# Patient Record
Sex: MALE | Race: WHITE | Employment: FULL TIME | URBAN - METROPOLITAN AREA
[De-identification: names, ages, dates, MRNs, and addresses within clinical notes are randomized per-mention and may not be internally consistent; named-entity substitution may affect disease eponyms.]

---

## 2017-02-13 ENCOUNTER — APPOINTMENT (OUTPATIENT)
Dept: GENERAL RADIOLOGY | Facility: HOSPITAL | Age: 57
DRG: 247 | End: 2017-02-13
Payer: COMMERCIAL

## 2017-02-13 ENCOUNTER — HOSPITAL ENCOUNTER (INPATIENT)
Facility: HOSPITAL | Age: 57
LOS: 2 days | Discharge: HOME | DRG: 247 | End: 2017-02-16
Attending: EMERGENCY MEDICINE | Admitting: INTERNAL MEDICINE
Payer: COMMERCIAL

## 2017-02-13 DIAGNOSIS — I25.10 CAD (CORONARY ARTERY DISEASE): ICD-10-CM

## 2017-02-13 DIAGNOSIS — Z95.5 S/P DRUG ELUTING CORONARY STENT PLACEMENT: ICD-10-CM

## 2017-02-13 DIAGNOSIS — I21.11 ST ELEVATION MYOCARDIAL INFARCTION INVOLVING RIGHT CORONARY ARTERY (HCC): Primary | ICD-10-CM

## 2017-02-13 PROCEDURE — 85730 THROMBOPLASTIN TIME PARTIAL: CPT

## 2017-02-13 PROCEDURE — 84484 ASSAY OF TROPONIN QUANT: CPT

## 2017-02-13 PROCEDURE — 93005 ELECTROCARDIOGRAM TRACING: CPT

## 2017-02-13 PROCEDURE — 99285 EMERGENCY DEPT VISIT HI MDM: CPT

## 2017-02-13 PROCEDURE — 71010 XR CHEST AP PORTABLE  (CPT=71010): CPT

## 2017-02-13 PROCEDURE — 80061 LIPID PANEL: CPT | Performed by: EMERGENCY MEDICINE

## 2017-02-13 PROCEDURE — 85025 COMPLETE CBC W/AUTO DIFF WBC: CPT

## 2017-02-13 PROCEDURE — 96374 THER/PROPH/DIAG INJ IV PUSH: CPT

## 2017-02-13 PROCEDURE — 80048 BASIC METABOLIC PNL TOTAL CA: CPT

## 2017-02-13 PROCEDURE — 93010 ELECTROCARDIOGRAM REPORT: CPT | Performed by: EMERGENCY MEDICINE

## 2017-02-13 PROCEDURE — 85610 PROTHROMBIN TIME: CPT

## 2017-02-13 RX ORDER — SODIUM CHLORIDE 9 MG/ML
INJECTION, SOLUTION INTRAVENOUS
Status: COMPLETED | OUTPATIENT
Start: 2017-02-13 | End: 2017-02-13

## 2017-02-13 RX ORDER — LIDOCAINE HYDROCHLORIDE 20 MG/ML
INJECTION, SOLUTION EPIDURAL; INFILTRATION; INTRACAUDAL; PERINEURAL
Status: COMPLETED
Start: 2017-02-13 | End: 2017-02-13

## 2017-02-13 RX ORDER — MIDAZOLAM HYDROCHLORIDE 1 MG/ML
INJECTION INTRAMUSCULAR; INTRAVENOUS
Status: COMPLETED
Start: 2017-02-13 | End: 2017-02-13

## 2017-02-14 ENCOUNTER — APPOINTMENT (OUTPATIENT)
Dept: INTERVENTIONAL RADIOLOGY/VASCULAR | Facility: HOSPITAL | Age: 57
DRG: 247 | End: 2017-02-14
Attending: EMERGENCY MEDICINE
Payer: COMMERCIAL

## 2017-02-14 ENCOUNTER — PRIOR ORIGINAL RECORDS (OUTPATIENT)
Dept: OTHER | Age: 57
End: 2017-02-14

## 2017-02-14 ENCOUNTER — APPOINTMENT (OUTPATIENT)
Dept: CV DIAGNOSTICS | Facility: HOSPITAL | Age: 57
DRG: 247 | End: 2017-02-14
Attending: INTERNAL MEDICINE
Payer: COMMERCIAL

## 2017-02-14 PROBLEM — I21.11 ST ELEVATION MYOCARDIAL INFARCTION INVOLVING RIGHT CORONARY ARTERY (HCC): Status: ACTIVE | Noted: 2017-02-14

## 2017-02-14 LAB
ANION GAP SERPL CALC-SCNC: 12 MMOL/L (ref 0–18)
ANION GAP SERPL CALC-SCNC: 9 MMOL/L (ref 0–18)
APTT PPP: 29.7 SECONDS (ref 23.2–35.3)
BASOPHILS # BLD: 0 K/UL (ref 0–0.2)
BASOPHILS # BLD: 0.1 K/UL (ref 0–0.2)
BASOPHILS NFR BLD: 0 %
BASOPHILS NFR BLD: 1 %
BUN SERPL-MCNC: 10 MG/DL (ref 8–20)
BUN SERPL-MCNC: 10 MG/DL (ref 8–20)
BUN/CREAT SERPL: 10.1 (ref 10–20)
BUN/CREAT SERPL: 7.4 (ref 10–20)
CALCIUM SERPL-MCNC: 8.5 MG/DL (ref 8.5–10.5)
CALCIUM SERPL-MCNC: 9.2 MG/DL (ref 8.5–10.5)
CHLORIDE SERPL-SCNC: 106 MMOL/L (ref 95–110)
CHLORIDE SERPL-SCNC: 106 MMOL/L (ref 95–110)
CHOLEST SERPL-MCNC: 105 MG/DL (ref 110–200)
CHOLEST SERPL-MCNC: 119 MG/DL (ref 110–200)
CK SERPL-CCNC: 397 U/L (ref 49–397)
CO2 SERPL-SCNC: 20 MMOL/L (ref 22–32)
CO2 SERPL-SCNC: 23 MMOL/L (ref 22–32)
CREAT SERPL-MCNC: 0.99 MG/DL (ref 0.5–1.5)
CREAT SERPL-MCNC: 1.36 MG/DL (ref 0.5–1.5)
EOSINOPHIL # BLD: 0 K/UL (ref 0–0.7)
EOSINOPHIL # BLD: 0.1 K/UL (ref 0–0.7)
EOSINOPHIL NFR BLD: 0 %
EOSINOPHIL NFR BLD: 1 %
ERYTHROCYTE [DISTWIDTH] IN BLOOD BY AUTOMATED COUNT: 13.3 % (ref 11–15)
ERYTHROCYTE [DISTWIDTH] IN BLOOD BY AUTOMATED COUNT: 13.3 % (ref 11–15)
GLUCOSE SERPL-MCNC: 130 MG/DL (ref 70–99)
GLUCOSE SERPL-MCNC: 139 MG/DL (ref 70–99)
HCT VFR BLD AUTO: 37.2 % (ref 41–52)
HCT VFR BLD AUTO: 40.6 % (ref 41–52)
HDLC SERPL-MCNC: 33 MG/DL
HDLC SERPL-MCNC: 36 MG/DL
HGB BLD-MCNC: 12.2 G/DL (ref 13.5–17.5)
HGB BLD-MCNC: 13.3 G/DL (ref 13.5–17.5)
INR BLD: 1.1 (ref 0.9–1.2)
LDLC SERPL CALC-MCNC: 56 MG/DL (ref 0–99)
LDLC SERPL CALC-MCNC: 62 MG/DL (ref 0–99)
LYMPHOCYTES # BLD: 0.9 K/UL (ref 1–4)
LYMPHOCYTES # BLD: 2.2 K/UL (ref 1–4)
LYMPHOCYTES NFR BLD: 17 %
LYMPHOCYTES NFR BLD: 8 %
MAGNESIUM SERPL-MCNC: 2 MG/DL (ref 1.8–2.5)
MCH RBC QN AUTO: 28.9 PG (ref 27–32)
MCH RBC QN AUTO: 29.3 PG (ref 27–32)
MCHC RBC AUTO-ENTMCNC: 32.7 G/DL (ref 32–37)
MCHC RBC AUTO-ENTMCNC: 32.8 G/DL (ref 32–37)
MCV RBC AUTO: 88.4 FL (ref 80–100)
MCV RBC AUTO: 89.3 FL (ref 80–100)
MONOCYTES # BLD: 0.6 K/UL (ref 0–1)
MONOCYTES # BLD: 1 K/UL (ref 0–1)
MONOCYTES NFR BLD: 5 %
MONOCYTES NFR BLD: 8 %
MRSA DNA SPEC QL NAA+PROBE: NEGATIVE
NEUTROPHILS # BLD AUTO: 9.3 K/UL (ref 1.8–7.7)
NEUTROPHILS # BLD AUTO: 9.7 K/UL (ref 1.8–7.7)
NEUTROPHILS NFR BLD: 74 %
NEUTROPHILS NFR BLD: 86 %
NONHDLC SERPL-MCNC: 72 MG/DL
NONHDLC SERPL-MCNC: 83 MG/DL
OSMOLALITY UR CALC.SUM OF ELEC: 287 MOSM/KG (ref 275–295)
OSMOLALITY UR CALC.SUM OF ELEC: 287 MOSM/KG (ref 275–295)
PLATELET # BLD AUTO: 309 K/UL (ref 140–400)
PLATELET # BLD AUTO: 444 K/UL (ref 140–400)
PMV BLD AUTO: 8.3 FL (ref 7.4–10.3)
PMV BLD AUTO: 8.5 FL (ref 7.4–10.3)
POTASSIUM SERPL-SCNC: 3.3 MMOL/L (ref 3.3–5.1)
POTASSIUM SERPL-SCNC: 3.7 MMOL/L (ref 3.3–5.1)
PROTHROMBIN TIME: 13.4 SECONDS (ref 11.8–14.5)
RBC # BLD AUTO: 4.17 M/UL (ref 4.5–5.9)
RBC # BLD AUTO: 4.59 M/UL (ref 4.5–5.9)
SODIUM SERPL-SCNC: 138 MMOL/L (ref 136–144)
SODIUM SERPL-SCNC: 138 MMOL/L (ref 136–144)
TRIGL SERPL-MCNC: 103 MG/DL (ref 1–149)
TRIGL SERPL-MCNC: 78 MG/DL (ref 1–149)
TROPONIN I SERPL-MCNC: 0.36 NG/ML (ref ?–0.03)
WBC # BLD AUTO: 11.2 K/UL (ref 4–11)
WBC # BLD AUTO: 12.7 K/UL (ref 4–11)

## 2017-02-14 PROCEDURE — 93458 L HRT ARTERY/VENTRICLE ANGIO: CPT

## 2017-02-14 PROCEDURE — B2111ZZ FLUOROSCOPY OF MULTIPLE CORONARY ARTERIES USING LOW OSMOLAR CONTRAST: ICD-10-PCS | Performed by: INTERNAL MEDICINE

## 2017-02-14 PROCEDURE — 3E033KZ INTRODUCTION OF OTHER DIAGNOSTIC SUBSTANCE INTO PERIPHERAL VEIN, PERCUTANEOUS APPROACH: ICD-10-PCS | Performed by: INTERNAL MEDICINE

## 2017-02-14 PROCEDURE — 93306 TTE W/DOPPLER COMPLETE: CPT

## 2017-02-14 PROCEDURE — B2151ZZ FLUOROSCOPY OF LEFT HEART USING LOW OSMOLAR CONTRAST: ICD-10-PCS | Performed by: INTERNAL MEDICINE

## 2017-02-14 PROCEDURE — 87641 MR-STAPH DNA AMP PROBE: CPT | Performed by: INTERNAL MEDICINE

## 2017-02-14 PROCEDURE — 93005 ELECTROCARDIOGRAM TRACING: CPT

## 2017-02-14 PROCEDURE — 027036Z DILATION OF CORONARY ARTERY, ONE ARTERY WITH THREE DRUG-ELUTING INTRALUMINAL DEVICES, PERCUTANEOUS APPROACH: ICD-10-PCS | Performed by: INTERNAL MEDICINE

## 2017-02-14 PROCEDURE — 93010 ELECTROCARDIOGRAM REPORT: CPT | Performed by: INTERNAL MEDICINE

## 2017-02-14 PROCEDURE — 80048 BASIC METABOLIC PNL TOTAL CA: CPT | Performed by: INTERNAL MEDICINE

## 2017-02-14 PROCEDURE — 93306 TTE W/DOPPLER COMPLETE: CPT | Performed by: INTERNAL MEDICINE

## 2017-02-14 PROCEDURE — 85025 COMPLETE CBC W/AUTO DIFF WBC: CPT | Performed by: INTERNAL MEDICINE

## 2017-02-14 PROCEDURE — 92941 PRQ TRLML REVSC TOT OCCL AMI: CPT

## 2017-02-14 PROCEDURE — 82550 ASSAY OF CK (CPK): CPT | Performed by: INTERNAL MEDICINE

## 2017-02-14 PROCEDURE — 80061 LIPID PANEL: CPT | Performed by: INTERNAL MEDICINE

## 2017-02-14 PROCEDURE — 83735 ASSAY OF MAGNESIUM: CPT | Performed by: INTERNAL MEDICINE

## 2017-02-14 PROCEDURE — 4A023N7 MEASUREMENT OF CARDIAC SAMPLING AND PRESSURE, LEFT HEART, PERCUTANEOUS APPROACH: ICD-10-PCS | Performed by: INTERNAL MEDICINE

## 2017-02-14 RX ORDER — OMEPRAZOLE 20 MG/1
20 CAPSULE, DELAYED RELEASE ORAL DAILY
COMMUNITY
End: 2017-03-08

## 2017-02-14 RX ORDER — POTASSIUM CHLORIDE 20 MEQ/1
40 TABLET, EXTENDED RELEASE ORAL ONCE
Status: COMPLETED | OUTPATIENT
Start: 2017-02-14 | End: 2017-02-14

## 2017-02-14 RX ORDER — ATORVASTATIN CALCIUM 40 MG/1
40 TABLET, FILM COATED ORAL NIGHTLY
Status: DISCONTINUED | OUTPATIENT
Start: 2017-02-14 | End: 2017-02-16

## 2017-02-14 RX ORDER — SODIUM CHLORIDE 9 MG/ML
INJECTION, SOLUTION INTRAVENOUS
Status: DISPENSED
Start: 2017-02-14 | End: 2017-02-14

## 2017-02-14 RX ORDER — ATORVASTATIN CALCIUM 40 MG/1
80 TABLET, FILM COATED ORAL NIGHTLY
COMMUNITY

## 2017-02-14 RX ORDER — SODIUM CHLORIDE 9 MG/ML
INJECTION, SOLUTION INTRAVENOUS CONTINUOUS
Status: DISCONTINUED | OUTPATIENT
Start: 2017-02-14 | End: 2017-02-14

## 2017-02-14 RX ORDER — LISINOPRIL 2.5 MG/1
2.5 TABLET ORAL DAILY
Status: DISCONTINUED | OUTPATIENT
Start: 2017-02-14 | End: 2017-02-16

## 2017-02-14 RX ORDER — MIDAZOLAM HYDROCHLORIDE 1 MG/ML
2 INJECTION INTRAMUSCULAR; INTRAVENOUS EVERY 5 MIN PRN
Status: DISCONTINUED | OUTPATIENT
Start: 2017-02-14 | End: 2017-02-14

## 2017-02-14 RX ORDER — ASPIRIN 81 MG/1
81 TABLET ORAL DAILY
Status: DISCONTINUED | OUTPATIENT
Start: 2017-02-15 | End: 2017-02-16

## 2017-02-14 RX ORDER — CETIRIZINE HYDROCHLORIDE 10 MG/1
10 TABLET ORAL DAILY
COMMUNITY

## 2017-02-14 RX ORDER — POTASSIUM CHLORIDE 20 MEQ/1
40 TABLET, EXTENDED RELEASE ORAL ONCE
Status: DISCONTINUED | OUTPATIENT
Start: 2017-02-14 | End: 2017-02-14

## 2017-02-14 RX ORDER — CETIRIZINE HYDROCHLORIDE 10 MG/1
10 TABLET ORAL DAILY
Status: DISCONTINUED | OUTPATIENT
Start: 2017-02-14 | End: 2017-02-14

## 2017-02-14 RX ORDER — CETIRIZINE HYDROCHLORIDE 10 MG/1
10 TABLET ORAL DAILY
Status: DISCONTINUED | OUTPATIENT
Start: 2017-02-14 | End: 2017-02-16

## 2017-02-14 RX ORDER — 0.9 % SODIUM CHLORIDE 0.9 %
VIAL (ML) INJECTION
Status: COMPLETED
Start: 2017-02-14 | End: 2017-02-14

## 2017-02-14 RX ORDER — MIDAZOLAM HYDROCHLORIDE 1 MG/ML
INJECTION INTRAMUSCULAR; INTRAVENOUS
Status: COMPLETED
Start: 2017-02-14 | End: 2017-02-14

## 2017-02-14 RX ORDER — SODIUM CHLORIDE 9 MG/ML
INJECTION, SOLUTION INTRAVENOUS
Status: COMPLETED
Start: 2017-02-14 | End: 2017-02-14

## 2017-02-14 NOTE — ED NOTES
Patient accompanied to cathlab on defibrillator by Dr. Nathalie John EMT-P, and myself. No changes to previous assessment. Still maintains CP at 7/10. SB, 52 on monitor.

## 2017-02-14 NOTE — PROGRESS NOTES
02/14/17 0710   Clinical Encounter Type   Visited With Family   Routine Visit Introduction   Continue Visiting Yes   Crisis Visit Trauma  (cardiac alert)   Referral From Nurse   Referral To    Family Spiritual Encounters   Family Coping Fearful;

## 2017-02-14 NOTE — ED NOTES
Dr. Rowdy Hardin made aware of patient's BP.  1L 0.9ns fluid bolus ordered. No order for further meds given.

## 2017-02-14 NOTE — PROGRESS NOTES
Patient transferred from room 228 to room 344. Right groin site intact. VSS. Patient is sinus bradycardia as low as 46 and asymptomatic  Report given to [facility].

## 2017-02-14 NOTE — ED PROVIDER NOTES
Patient Seen in: [facility] AND [facility] Emergency Department    History   Patient presents with:  Chest Pain Angina (cardiovascular)      HPI    The patient presents via EMS after becoming weak and having left-sided chest pain starting at 11:05 PM after playi for cough and shortness of breath. Cardiovascular: Positive for chest pain. Negative for palpitations. Gastrointestinal: Positive for nausea. Negative for vomiting and abdominal pain. Genitourinary: Negative for dysuria and hematuria.    Musculoskele BUN/CREA Ratio 7.4 (*)     GFR, Non- 54 (*)     All other components within normal limits   LIPID PANEL - Abnormal; Notable for the following:     Cholesterol, Total 105 (*)     All other components within normal limits   CBC W/ DIFFEREN SCREEN BY PCR      EKG    Rate, intervals and axes as noted on EKG Report.   Rate: Bradycardic  Rhythm: Undetermined rhythm, possible third-degree heart block  Reading: Inferior STEMI, abnormal EKG                  MDM     Pulse Ox: 97%, Room air, Normal

## 2017-02-14 NOTE — PROCEDURES
[facility]    PATIENT'S NAME: Doug Jona   ATTENDING PHYSICIAN: Jaky Farley. Mariangel Quiroz MD   OPERATING PHYSICIAN: Cuco Cardona.  Virgene Hashimoto, MD   PATIENT ACCOUNT#:   [id number]    LOCATION:  [address]  MEDICAL RECORD #:   [id number] guide liner to get a stent into the more distal total occlusion. The vessel after this had become totally occluded, but I reopened it with another balloon, 3.5 mm, and then stented it with a 4 mm x 12 mm stent; therefore, he has 3 Xience stents.   Ted isbell

## 2017-02-14 NOTE — ED NOTES
Received patient from EMS to [facility]. Patient to ED for eval mid chest pain/tightness. States he was having chest pain intermittently throughout day and began to have severe pain while playing hockey.   Patient was found to be cool/diaphoretic on locker ro

## 2017-02-14 NOTE — CONSULTS
[facility]    Cardiology Consultation  [facility]    [address] [facility] Patient Status:  Emergency    10/9/1960 MRN [id number]   Location [facility] Attending No att. providers found kg), SpO2 100 %.   Intake/Output:   Last 3 shifts: [id number]@   This shift:       Vent Settings:      Hemodynamic parameters (last 24 hours):      Scheduled Meds:   • metoprolol Tartrate  12.5 mg Oral 2x Daily(Beta Blocker)       Continuous Infusions:

## 2017-02-14 NOTE — PLAN OF CARE
Received patient from cath lab at [address] [place] [address], alert, DAVIS, follows commands. Monitor sinus bravo, SBP greater than 100. Rt groin drsg D/I, pedal pulses palpable. Instructed on bedrest, keeping right leg straight. Family at bedside.

## 2017-02-14 NOTE — PROGRESS NOTES
followup  Doing well. No cp  bp 100s hr around 50  No murmur  Lungs clear  Groin ok    Repeat ekg pending    Plan, asa/brilinta, beta blocker if able. Low dose acei, continue lipitor    Transfer to tele    Long discussion with patient.  Plan staged interven

## 2017-02-14 NOTE — PLAN OF CARE
CARDIOVASCULAR - ADULT    • Maintains optimal cardiac output and hemodynamic stability Progressing    • Absence of cardiac arrhythmias or at baseline Progressing    Monitor sinus bravo at 50-38 bpm with re-profusion arrhythmias.  Electrolyte protocol initia

## 2017-02-14 NOTE — H&P
[facility]    Brief Cardiac Cath Note  Lord Pedlar Patient Status:  Emergency    10/9/1960 MRN [id number]   Location [facility] Attending No att. providers found   [address] Day # 1 PCP Unknown Pcp       C

## 2017-02-14 NOTE — CARDIAC REHAB
Cardiac Rehab Phase I    Activity:          Education:  Handouts provided and reviewed: [facility] Booklet. Diet: Healthy Cardiac diet reviewed. Disease Process: Disease process reviewed.     Reviewed the following:       RISK FACTORS: R

## 2017-02-15 LAB — POTASSIUM SERPL-SCNC: 4.2 MMOL/L (ref 3.3–5.1)

## 2017-02-15 PROCEDURE — 84132 ASSAY OF SERUM POTASSIUM: CPT | Performed by: INTERNAL MEDICINE

## 2017-02-15 NOTE — PROGRESS NOTES
[facility] - [facility]    Cardiology - AMG-S  Progress Note    Nancy Ortiz Patient Status:  Inpatient    10/9/1960 MRN [id number]   Location [facility] 3W/SW Attending Ellis Leblanc MD   Hosp Day # 2 PCP Unknown Pcp       Assessmen Borderline cardiomegaly. 2. A preliminary report was submitted and there is agreement without major discrepancies.            ECG - NA  TELE - NSVT AS NOTED    -------------------------------------------------------------------------------------------------

## 2017-02-15 NOTE — PROGRESS NOTES
02/15/17 1320   Clinical Encounter Type   Visited With Family; Patient   Routine Visit Follow-up   Continue Visiting Yes   Patient's Supportive Strategies/Resources family, friends   Patient Spiritual Encounters   Spiritual Assessment Completed 1   Adapt

## 2017-02-15 NOTE — CONSULTS
[facility]    Report of EP Cardiology Consultation    [address] Patient Status:  Inpatient    10/9/1960 MRN [id number]   Location [facility] 3W/SW Attending Daly Weiss MD   Hosp Day # 2 PCP Unknown Pcp     Date of Ad mg Oral Nightly   lisinopril (PRINIVIL,ZESTRIL) tab 2.5 mg 2.5 mg Oral Daily       Prescriptions prior to admission:  omeprazole 20 MG Oral Capsule Delayed Release Take 20 mg by mouth daily. cetirizine 10 MG Oral Tab Take 10 mg by mouth daily.    aspirin 0.36* 02/13/2017    02/14/2017         Thank you for allowing me to participate in the care of your patient.     Vianca Patel  2/15/2017

## 2017-02-16 ENCOUNTER — PRIOR ORIGINAL RECORDS (OUTPATIENT)
Dept: OTHER | Age: 57
End: 2017-02-16

## 2017-02-16 VITALS
DIASTOLIC BLOOD PRESSURE: 72 MMHG | SYSTOLIC BLOOD PRESSURE: 128 MMHG | WEIGHT: 172 LBS | RESPIRATION RATE: 20 BRPM | HEIGHT: 65 IN | BODY MASS INDEX: 28.66 KG/M2 | HEART RATE: 56 BPM | OXYGEN SATURATION: 96 % | TEMPERATURE: 98 F

## 2017-02-16 RX ORDER — LISINOPRIL 2.5 MG/1
2.5 TABLET ORAL DAILY
Qty: 30 TABLET | Refills: 5 | Status: ON HOLD | OUTPATIENT
Start: 2017-02-16 | End: 2017-03-20

## 2017-02-16 NOTE — PAYOR COMM NOTE
[facility] #[id number]  (55 year old M)       [facility] [id number]         Omar Bansal MD Physician Signed  Consults 2/14/2017  1:30 AM     Consult Orders:     IP Consult to Cardiology Once [[id number]] ordered by Ti Kearns Gastrointestinal: Negative for nausea, vomiting, abdominal pain and melena. Musculoskeletal: Negative for myalgias and back pain.    Neurological: Negative for dizziness, loss of consciousness, weakness and headaches.           Physical Exam:    Blood pre   ST elevation myocardial infarction involving right coronary artery (HCC)  GIVEN ASA, NOT HEPARIN  URGENT CATH/PCI. DISCUSSED WITH PT. HE AGREES. Recommendations:  SEE ABOVE.      Thank you for allowing me to participate in the care of your patient The left main coronary is normal.  The left circumflex coronary is essentially a large bifurcating obtuse marginal that is free of narrowing.  The left anterior descending coronary artery has a 70% narrowing after a diagonal.  The diagonal has some narrowi 3.      Mild inferior wall hypokinesis, ejection fraction 50%; elevated end-diastolic pressure of about 22.   4.      Successful closure with an Angio-Seal device.     5.      The patient was extremely anxious.  We gave him large volumes of sedation to comp aspirin 81 MG Oral Tab  Take 81 mg by mouth daily.  Disp:   Rfl:   2/12/2017 at 0000    Atorvastatin Calcium 40 MG Oral Tab  Take 40 mg by mouth nightly.  Disp:   Rfl:   2/12/2017 at 0000    psyllium 28 % Oral Powd Pack  Take 1 packet by mouth daily.  Disp Neck: Normal range of motion. Neck supple. Cardiovascular: Intact distal pulses.  Exam reveals no friction rub.     No murmur heard. Pulmonary/Chest: Effort normal and breath sounds normal. No respiratory distress. He has no wheezes. Abdominal: Soft. [id number]                               Abnormality         NMLO                      ---------                               -----------         ------                      CBC W/ DIFFERENTIAL[[id number]]          Abnormal            Final result         ED Course: Patient presents via EMS after developing chest pain after playing hockey this evening.  Inferior ST elevation noted by EMS, cardiac alert called from the field at 11:38 PM.  Patient arrived, IV/O2/monitor established, pacer pads placed.  EKG ob   ST elevation myocardial infarction involving right coronary artery ([facility] [address]  Doing well  nsvt as noted, k, mg ok, ef 45. Will ask ep to review but suspect just watch.  DOES HAVE SOME RV DYSFUNCTION    LIPIDS OK    ON LOW DOSE ACE/ASA/BRILINTA MAY OR MAY NOT Randy Guzman #[id number]  (55 year old M)       [address] [place] [address] [id number]         RADHA Steel Nurse Practitioner Signed  Progress Notes 2/16/2017  8:23 AM      Expand All Collapse [facility] Heart Cardiology Pr   Total Intake(mL/kg)  240 (3.1)  940 (12)        Urine (mL/kg/hr)  700 (0.4)  1675 (0.9)        Stool  0 (0)          Total Output  700  1675         Net  -460  -735                     Void Urine Occurrence    4 x        Stool Occurrence  0 x  0 x

## 2017-02-16 NOTE — PLAN OF CARE
CARDIOVASCULAR - ADULT    • Maintains optimal cardiac output and hemodynamic stability Progressing    • Absence of cardiac arrhythmias or at baseline Progressing        Patient Centered Care    • Patient preferences are identified and integrated in the pat

## 2017-02-16 NOTE — CARDIAC REHAB
Cardiac Rehab Phase I    Activity:  Distance:Self  Assistance needed No  Patient tolerated activity Well. Education:  Handouts provided and reviewed: [address] Diet: Healthy Cardiac diet reviewed.     Disease Process: Disease p

## 2017-02-16 NOTE — PROGRESS NOTES
[facility] Heart Cardiology Progress Note      Veleta Shoulders Patient Status:  Inpatient    10/9/1960 MRN [id number]   Location [facility] 3W/SW Attending Alec Dalton MD   Hosp Day # 3 PCP Unknown Pcp       As effort  CV: Heart with regular rate and rhythm, Nl S1,S2 ,no S3 or murmur  Abd: Abdomen soft, nontender, nondistended, no organomegaly, bowel sounds present  Ext:  no clubbing, no cyanosis,no edema  Neuro: no focal deficits  Skin: no rashes or lesions, rig

## 2017-02-16 NOTE — CARDIAC REHAB
Cardiac Rehab Phase I    Activity:      . Education:  Handouts provided and reviewed: [address] Diet: Healthy Cardiac diet reviewed. Disease Process: Disease process reviewed.     Reviewed the following:      RISK FACTORS: R

## 2017-02-17 ENCOUNTER — PRIOR ORIGINAL RECORDS (OUTPATIENT)
Dept: OTHER | Age: 57
End: 2017-02-17

## 2017-02-17 NOTE — DISCHARGE SUMMARY
[facility]    PATIENT'S NAME: Randy Jama   ATTENDING PHYSICIAN: Keon Strickland.  Dante Dominguez MD   PATIENT ACCOUNT#:   [id number]    LOCATION:  [address] [place] [address] [address] RECORD #:   [id number]       YOB: 1960  ADMISSION DATE:       02/13

## 2017-02-20 ENCOUNTER — HOSPITAL ENCOUNTER (OUTPATIENT)
Dept: ULTRASOUND IMAGING | Facility: HOSPITAL | Age: 57
Discharge: HOME | End: 2017-02-20
Attending: INTERNAL MEDICINE
Payer: COMMERCIAL

## 2017-02-20 ENCOUNTER — MYAURORA ACCOUNT LINK (OUTPATIENT)
Dept: OTHER | Age: 57
End: 2017-02-20

## 2017-02-20 ENCOUNTER — PRIOR ORIGINAL RECORDS (OUTPATIENT)
Dept: OTHER | Age: 57
End: 2017-02-20

## 2017-02-20 DIAGNOSIS — I72.4 PSEUDOANEURYSM OF RIGHT FEMORAL ARTERY (HCC): ICD-10-CM

## 2017-02-20 LAB
BUN: 10 MG/DL
CALCIUM: 8.5 MG/DL
CHLORIDE: 106 MEQ/L
CHOLESTEROL, TOTAL: 105 MG/DL
CREATININE KINASE: 397 U/L
CREATININE, SERUM: 0.99 MG/DL
GLUCOSE: 130 MG/DL
GLUCOSE: 130 MG/DL
HDL CHOLESTEROL: 33 MG/DL
HEMATOCRIT: 37.2 %
HEMOGLOBIN: 12.2 G/DL
LDL CHOLESTEROL: 56 MG/DL
NON-HDL CHOLESTEROL: 72 MG/DL
PLATELETS: 309 K/UL
POTASSIUM, SERUM: 3.7 MEQ/L
RED BLOOD COUNT: 4.17 X 10-6/U
SODIUM: 138 MEQ/L
TRIGLYCERIDES: 78 MG/DL
WHITE BLOOD COUNT: 11.2 X 10-3/U

## 2017-02-20 PROCEDURE — 93926 LOWER EXTREMITY STUDY: CPT

## 2017-02-21 ENCOUNTER — PRIOR ORIGINAL RECORDS (OUTPATIENT)
Dept: OTHER | Age: 57
End: 2017-02-21

## 2017-03-06 ENCOUNTER — PRIOR ORIGINAL RECORDS (OUTPATIENT)
Dept: OTHER | Age: 57
End: 2017-03-06

## 2017-03-08 RX ORDER — SODIUM CHLORIDE 9 MG/ML
INJECTION, SOLUTION INTRAVENOUS ONCE
Status: COMPLETED | OUTPATIENT
Start: 2017-03-09 | End: 2017-03-09

## 2017-03-09 ENCOUNTER — HOSPITAL ENCOUNTER (OUTPATIENT)
Dept: INTERVENTIONAL RADIOLOGY/VASCULAR | Facility: HOSPITAL | Age: 57
Discharge: HOME | End: 2017-03-09
Attending: INTERNAL MEDICINE | Admitting: INTERNAL MEDICINE
Payer: COMMERCIAL

## 2017-03-09 VITALS
OXYGEN SATURATION: 96 % | BODY MASS INDEX: 29.02 KG/M2 | HEART RATE: 62 BPM | HEIGHT: 64.25 IN | WEIGHT: 170 LBS | DIASTOLIC BLOOD PRESSURE: 67 MMHG | RESPIRATION RATE: 16 BRPM | SYSTOLIC BLOOD PRESSURE: 119 MMHG

## 2017-03-09 LAB
ANION GAP SERPL CALC-SCNC: 9 MMOL/L (ref 0–18)
BASOPHILS # BLD: 0 K/UL (ref 0–0.2)
BASOPHILS NFR BLD: 1 %
BUN SERPL-MCNC: 18 MG/DL (ref 8–20)
BUN/CREAT SERPL: 18.9 (ref 10–20)
CALCIUM SERPL-MCNC: 9.3 MG/DL (ref 8.5–10.5)
CHLORIDE SERPL-SCNC: 106 MMOL/L (ref 95–110)
CO2 SERPL-SCNC: 24 MMOL/L (ref 22–32)
CREAT SERPL-MCNC: 0.95 MG/DL (ref 0.5–1.5)
EOSINOPHIL # BLD: 0.2 K/UL (ref 0–0.7)
EOSINOPHIL NFR BLD: 3 %
ERYTHROCYTE [DISTWIDTH] IN BLOOD BY AUTOMATED COUNT: 13.3 % (ref 11–15)
GLUCOSE SERPL-MCNC: 115 MG/DL (ref 70–99)
HCT VFR BLD AUTO: 39 % (ref 41–52)
HGB BLD-MCNC: 13.5 G/DL (ref 13.5–17.5)
LYMPHOCYTES # BLD: 1.4 K/UL (ref 1–4)
LYMPHOCYTES NFR BLD: 20 %
MCH RBC QN AUTO: 30.1 PG (ref 27–32)
MCHC RBC AUTO-ENTMCNC: 34.7 G/DL (ref 32–37)
MCV RBC AUTO: 87 FL (ref 80–100)
MONOCYTES # BLD: 0.6 K/UL (ref 0–1)
MONOCYTES NFR BLD: 9 %
NEUTROPHILS # BLD AUTO: 4.6 K/UL (ref 1.8–7.7)
NEUTROPHILS NFR BLD: 67 %
OSMOLALITY UR CALC.SUM OF ELEC: 291 MOSM/KG (ref 275–295)
PA ADP PRP-ACNC: 33
PLATELET # BLD AUTO: 255 K/UL (ref 140–400)
PMV BLD AUTO: 7.9 FL (ref 7.4–10.3)
POTASSIUM SERPL-SCNC: 3.9 MMOL/L (ref 3.3–5.1)
RBC # BLD AUTO: 4.48 M/UL (ref 4.5–5.9)
SODIUM SERPL-SCNC: 139 MMOL/L (ref 136–144)
WBC # BLD AUTO: 6.9 K/UL (ref 4–11)

## 2017-03-09 PROCEDURE — 75710 ARTERY X-RAYS ARM/LEG: CPT

## 2017-03-09 PROCEDURE — 80048 BASIC METABOLIC PNL TOTAL CA: CPT | Performed by: INTERNAL MEDICINE

## 2017-03-09 PROCEDURE — 93454 CORONARY ARTERY ANGIO S&I: CPT

## 2017-03-09 PROCEDURE — 36246 INS CATH ABD/L-EXT ART 2ND: CPT

## 2017-03-09 PROCEDURE — 85347 COAGULATION TIME ACTIVATED: CPT

## 2017-03-09 PROCEDURE — 85025 COMPLETE CBC W/AUTO DIFF WBC: CPT | Performed by: INTERNAL MEDICINE

## 2017-03-09 PROCEDURE — B41G1ZZ FLUOROSCOPY OF LEFT LOWER EXTREMITY ARTERIES USING LOW OSMOLAR CONTRAST: ICD-10-PCS | Performed by: INTERNAL MEDICINE

## 2017-03-09 PROCEDURE — 85576 BLOOD PLATELET AGGREGATION: CPT | Performed by: INTERNAL MEDICINE

## 2017-03-09 RX ORDER — SODIUM CHLORIDE 9 MG/ML
INJECTION, SOLUTION INTRAVENOUS
Status: COMPLETED
Start: 2017-03-09 | End: 2017-03-09

## 2017-03-09 RX ORDER — MIDAZOLAM HYDROCHLORIDE 1 MG/ML
2 INJECTION INTRAMUSCULAR; INTRAVENOUS EVERY 5 MIN PRN
Status: DISCONTINUED | OUTPATIENT
Start: 2017-03-09 | End: 2017-03-09

## 2017-03-09 RX ORDER — MIDAZOLAM HYDROCHLORIDE 1 MG/ML
INJECTION INTRAMUSCULAR; INTRAVENOUS
Status: COMPLETED
Start: 2017-03-09 | End: 2017-03-09

## 2017-03-09 RX ORDER — HEPARIN SODIUM 1000 [USP'U]/ML
7000 INJECTION, SOLUTION INTRAVENOUS; SUBCUTANEOUS ONCE
Status: COMPLETED | OUTPATIENT
Start: 2017-03-09 | End: 2017-03-09

## 2017-03-09 RX ORDER — NITROGLYCERIN 20 MG/100ML
INJECTION INTRAVENOUS
Status: DISCONTINUED
Start: 2017-03-09 | End: 2017-03-09 | Stop reason: WASHOUT

## 2017-03-09 RX ORDER — HEPARIN SODIUM 1000 [USP'U]/ML
INJECTION, SOLUTION INTRAVENOUS; SUBCUTANEOUS
Status: COMPLETED
Start: 2017-03-09 | End: 2017-03-09

## 2017-03-09 RX ORDER — ACETAMINOPHEN AND CODEINE PHOSPHATE 300; 30 MG/1; MG/1
TABLET ORAL
Status: COMPLETED
Start: 2017-03-09 | End: 2017-03-09

## 2017-03-09 RX ORDER — SODIUM CHLORIDE 9 MG/ML
INJECTION, SOLUTION INTRAVENOUS CONTINUOUS
Status: ACTIVE | OUTPATIENT
Start: 2017-03-09 | End: 2017-03-09

## 2017-03-09 RX ORDER — ACETAMINOPHEN AND CODEINE PHOSPHATE 300; 30 MG/1; MG/1
2 TABLET ORAL EVERY 6 HOURS PRN
Status: DISCONTINUED | OUTPATIENT
Start: 2017-03-09 | End: 2017-03-09

## 2017-03-09 RX ORDER — LIDOCAINE HYDROCHLORIDE 20 MG/ML
INJECTION, SOLUTION EPIDURAL; INFILTRATION; INTRACAUDAL; PERINEURAL
Status: COMPLETED
Start: 2017-03-09 | End: 2017-03-09

## 2017-03-09 RX ADMIN — ACETAMINOPHEN AND CODEINE PHOSPHATE 2 TABLET: 300; 30 TABLET ORAL at 13:40:00

## 2017-03-09 RX ADMIN — MIDAZOLAM HYDROCHLORIDE 1 MG: 1 INJECTION INTRAMUSCULAR; INTRAVENOUS at 09:12:00

## 2017-03-09 RX ADMIN — SODIUM CHLORIDE: 9 INJECTION, SOLUTION INTRAVENOUS at 07:45:00

## 2017-03-09 RX ADMIN — MIDAZOLAM HYDROCHLORIDE 2 MG: 1 INJECTION INTRAMUSCULAR; INTRAVENOUS at 09:03:00

## 2017-03-09 RX ADMIN — HEPARIN SODIUM 7000 UNITS: 1000 INJECTION, SOLUTION INTRAVENOUS; SUBCUTANEOUS at 09:11:00

## 2017-03-09 RX ADMIN — MIDAZOLAM HYDROCHLORIDE 1 MG: 1 INJECTION INTRAMUSCULAR; INTRAVENOUS at 09:07:00

## 2017-03-09 NOTE — DISCHARGE SUMMARY
[facility]    Discharge Summary    Avel Stanley Patient Status:  Outpatient in a Bed    10/9/1960 MRN [id number]   Location [facility] Attending Sindi Last MD   Hosp Day # 0 PCP No primary care pro

## 2017-03-09 NOTE — CONSULTS
[facility] - [facility]    Report of Consultation    Eileen Hankins Patient Status:  Outpatient in a Bed    10/9/1960 MRN [id number]   Location [facility] Attending Betty Starr MD   Hosp Day # 0 PCP No primary car heart attack.         Current Medications:    Current Facility-Administered Medications:  fentaNYL citrate (SUBLIMAZE) 0.05 MG/ML injection 50 mcg 50 mcg Intravenous Q5 Min PRN   Midazolam HCl (VERSED) 2 MG/2ML injection 2 mg 2 mg Intravenous Q5 Min PRN   0 and rhythm with a normal S1,S2 and no pathological murmurs. No rub or extra heart sounds appreciated. Abdomen is soft nontender with no organomegaly mass. There is some widening of the abdominal aorta suggesting a possible small aneurysm.   Extremities a order a ultrasound was of the abdominal aorta to rule out a small aneurysm. Thank you for allowing me to participate in the care of your patient.     ABBIE ROCHE  3/9/2017

## 2017-03-09 NOTE — PROGRESS NOTES
Pt post angiogram. At 1026 swelling was noted on Lt groin. Manual pressure was applied to site. Dressing was reapplied at  1100.

## 2017-03-09 NOTE — H&P
SPOKE WITH DR ROCHE  DISCUSSED SITUATION WITH PT AND FAMILY  HAS RESTENOSIS IN 2 LOCATIONS IN RCA. BEST LONG TERM PROGNOSIS WOULD BE WITH CABG    CURRENT PLAN IS TO [facility]MIT Monday [facility]NOON [facility]  AND START INTEGRILIN DRIP T

## 2017-03-09 NOTE — PLAN OF CARE
Pt ambulated to bathroom at 0051-kqtodj-xo felt lightheaded and diaphoretic-back to cart IVF opened Pt feeling Better Dr Chau Borders updated Site soft dry and intact

## 2017-03-10 NOTE — PLAN OF CARE
Pt ambulated to chair at 1700 and tolerated well Dr Mario School saw pt at [phone]  And ok for pt to go home after he ambulates if he feels ok Pt ambulated in hallway with nurse at 1800 hr 60s with ambulation Tolerated well No dizziness or lightheadedness Site soft d

## 2017-03-13 ENCOUNTER — HOSPITAL ENCOUNTER (INPATIENT)
Facility: HOSPITAL | Age: 57
LOS: 7 days | Discharge: HOME HEALTH | DRG: 236 | End: 2017-03-20
Attending: THORACIC SURGERY (CARDIOTHORACIC VASCULAR SURGERY) | Admitting: HOSPITALIST
Payer: COMMERCIAL

## 2017-03-13 ENCOUNTER — APPOINTMENT (OUTPATIENT)
Dept: ULTRASOUND IMAGING | Facility: HOSPITAL | Age: 57
DRG: 236 | End: 2017-03-13
Attending: CLINICAL NURSE SPECIALIST
Payer: COMMERCIAL

## 2017-03-13 DIAGNOSIS — I25.10 CAD, MULTIPLE VESSEL: Primary | ICD-10-CM

## 2017-03-13 LAB
APTT PPP: 36.3 SECONDS (ref 23.2–35.3)
BILIRUB UR QL: NEGATIVE
CLARITY UR: CLEAR
COLOR UR: YELLOW
GLUCOSE UR-MCNC: NEGATIVE MG/DL
HGB UR QL STRIP.AUTO: NEGATIVE
INR BLD: 1 (ref 0.9–1.2)
KETONES UR-MCNC: NEGATIVE MG/DL
LEUKOCYTE ESTERASE UR QL STRIP.AUTO: NEGATIVE
Lab: 105 SECONDS (ref ?–121)
MRSA DNA SPEC QL NAA+PROBE: NEGATIVE
NITRITE UR QL STRIP.AUTO: NEGATIVE
PA ADP PRP-ACNC: 299
PH UR: 6 [PH] (ref 5–8)
PROT UR-MCNC: NEGATIVE MG/DL
PROTHROMBIN TIME: 12.7 SECONDS (ref 11.8–14.5)
SP GR UR STRIP: 1.01 (ref 1–1.03)
UROBILINOGEN UR STRIP-ACNC: <2
VIT C UR-MCNC: NEGATIVE MG/DL

## 2017-03-13 PROCEDURE — 93880 EXTRACRANIAL BILAT STUDY: CPT

## 2017-03-13 PROCEDURE — 99222 1ST HOSP IP/OBS MODERATE 55: CPT | Performed by: HOSPITALIST

## 2017-03-13 RX ORDER — BISACODYL 10 MG
10 SUPPOSITORY, RECTAL RECTAL
Status: DISCONTINUED | OUTPATIENT
Start: 2017-03-13 | End: 2017-03-16

## 2017-03-13 RX ORDER — ACETAMINOPHEN 325 MG/1
650 TABLET ORAL EVERY 6 HOURS PRN
Status: DISCONTINUED | OUTPATIENT
Start: 2017-03-13 | End: 2017-03-16

## 2017-03-13 RX ORDER — TEMAZEPAM 15 MG/1
15 CAPSULE ORAL NIGHTLY PRN
Status: DISCONTINUED | OUTPATIENT
Start: 2017-03-13 | End: 2017-03-17

## 2017-03-13 RX ORDER — CETIRIZINE HYDROCHLORIDE 10 MG/1
10 TABLET ORAL DAILY
Status: DISCONTINUED | OUTPATIENT
Start: 2017-03-13 | End: 2017-03-20

## 2017-03-13 RX ORDER — SODIUM PHOSPHATE, DIBASIC AND SODIUM PHOSPHATE, MONOBASIC 7; 19 G/133ML; G/133ML
1 ENEMA RECTAL ONCE AS NEEDED
Status: ACTIVE | OUTPATIENT
Start: 2017-03-13 | End: 2017-03-13

## 2017-03-13 RX ORDER — ATORVASTATIN CALCIUM 40 MG/1
40 TABLET, FILM COATED ORAL NIGHTLY
Status: DISCONTINUED | OUTPATIENT
Start: 2017-03-13 | End: 2017-03-20

## 2017-03-13 RX ORDER — 0.9 % SODIUM CHLORIDE 0.9 %
VIAL (ML) INJECTION
Status: COMPLETED
Start: 2017-03-13 | End: 2017-03-13

## 2017-03-13 RX ORDER — ASCORBIC ACID 500 MG
1000 TABLET ORAL ONCE
Status: DISCONTINUED | OUTPATIENT
Start: 2017-03-16 | End: 2017-03-15

## 2017-03-13 RX ORDER — POLYETHYLENE GLYCOL 3350 17 G/17G
17 POWDER, FOR SOLUTION ORAL DAILY PRN
Status: DISCONTINUED | OUTPATIENT
Start: 2017-03-13 | End: 2017-03-20

## 2017-03-13 RX ORDER — ACETAMINOPHEN 650 MG/1
650 SUPPOSITORY RECTAL EVERY 6 HOURS PRN
Status: DISCONTINUED | OUTPATIENT
Start: 2017-03-13 | End: 2017-03-16

## 2017-03-13 RX ORDER — SODIUM CHLORIDE 9 MG/ML
INJECTION, SOLUTION INTRAVENOUS CONTINUOUS
Status: DISCONTINUED | OUTPATIENT
Start: 2017-03-13 | End: 2017-03-20

## 2017-03-13 RX ORDER — DOCUSATE SODIUM 100 MG/1
100 CAPSULE, LIQUID FILLED ORAL 2 TIMES DAILY
Status: DISCONTINUED | OUTPATIENT
Start: 2017-03-13 | End: 2017-03-20

## 2017-03-13 RX ORDER — ONDANSETRON 2 MG/ML
4 INJECTION INTRAMUSCULAR; INTRAVENOUS EVERY 6 HOURS PRN
Status: DISCONTINUED | OUTPATIENT
Start: 2017-03-13 | End: 2017-03-20

## 2017-03-13 NOTE — RESPIRATORY THERAPY NOTE
BABAR ASSESSMENT:    Pt does not have a previous diagnosis of BABAR. Pt does not routinely use a CPAP device at home. This pt is not suspected to be at high risk for BABAR and sleep lab packet was not provided to patient for outpatient follow-up.

## 2017-03-13 NOTE — CONSULTS
Cardiology (Consult dictated)    Assessment:  1. CAD. Acute IWMI 1 month ago with placement of GEOVANY in proximal - mid RCA. Also disease in proximal LAD. Restudy angio 3/09/17 showed severe focal instent restenosis in RCA. CABG recommended.  Ticagrelor stoppe

## 2017-03-13 NOTE — PLAN OF CARE
CARDIOVASCULAR - ADULT    • Maintains optimal cardiac output and hemodynamic stability Progressing    • Absence of cardiac arrhythmias or at baseline Progressing        HEMATOLOGIC - ADULT    • Maintains hematologic stability Progressing    • Free from ble

## 2017-03-13 NOTE — CONSULTS
[facility]    PATIENT'S NAME: Mike Cantu   ATTENDING PHYSICIAN: Grady Bowen MD   CONSULTING PHYSICIAN: Fredo Coreas.  Boni Rock MD   PATIENT ACCOUNT#:   [id number]    LOCATION:  [address] [place] [address] RECORD #:   [id number]       DATE OF BIRTH: smoked. Does drink caffeine 4 to 5 cups a day. Modest to moderate alcohol. He is active, , self-employed. REVIEW OF SYSTEMS:  Otherwise negative.       PHYSICAL EXAMINATION:    GENERAL:  Well-developed, well-nourished male, in no acute distre MD Rosemarie Jhaveri.  Whitney Canseco MD

## 2017-03-13 NOTE — H&P
[address] Patient Status:  Inpatient    10/9/1960 MRN [id number]   Location [facility] 2W/SW Attending Joyce Suraez MD   Hosp Day # 0 PCP No primary care provider on file.      Mayra Brizuela noted in HPI. Physical Exam:   Vital signs: Blood pressure 121/68, pulse 56, temperature 96.9 °F (36.1 °C), temperature source Temporal, resp. rate 17, SpO2 99 %. General: No acute distress. Alert and oriented x 3. HEENT: EOM-I. PERRL  Neck: No JVD.  Osiel Ramirez

## 2017-03-13 NOTE — HISTORICAL OFFICE NOTE
Armaniryley Saldana  : 10/09/1960  ACCOUNT:  [id number]  [phone]  PCP:      TODAY'S DATE: 2017  DICTATED BY:  RADHA Torres]    CHIEF COMPLAINT: [Followup of Atherosclerotic Heart Disease Of Native Coronary Artery, Followup of MI, Acute and Fo negative. CV: see HPI; denies claudication. RESP: denies chronic cough, hemoptysis. GI: denies melena, hematochezia. : no hematuria. INTEG: no new rashes, lesions. MS: thigh tightness/pain. NEURO: lightheaded. HEM/LYMPH: denies easy bruising.  ALL: no new hematoma and a bruit. There is resolving ecchymosis seen. Distal pulses are present; however, due to his thigh symptoms and a bruit, I will get a STAT arterial Doppler to rule out a pseudoaneurysm. The patient will remain on aspirin and Brilinta.   He is 02/20/17 PriLOSEC OTC          20MG      1 daily                                  02/20/17 ZyrTEC Allergy        10MG      1 daily                                    RADHA Roque/jaimee-Job ID: [id number]  D: 02/20/2017   T: 02/21/2017        Yael Romero

## 2017-03-14 ENCOUNTER — APPOINTMENT (OUTPATIENT)
Dept: CV DIAGNOSTICS | Facility: HOSPITAL | Age: 57
DRG: 236 | End: 2017-03-14
Attending: CLINICAL NURSE SPECIALIST
Payer: COMMERCIAL

## 2017-03-14 ENCOUNTER — APPOINTMENT (OUTPATIENT)
Dept: ULTRASOUND IMAGING | Facility: HOSPITAL | Age: 57
DRG: 236 | End: 2017-03-14
Attending: CLINICAL NURSE SPECIALIST
Payer: COMMERCIAL

## 2017-03-14 ENCOUNTER — APPOINTMENT (OUTPATIENT)
Dept: GENERAL RADIOLOGY | Facility: HOSPITAL | Age: 57
DRG: 236 | End: 2017-03-14
Attending: CLINICAL NURSE SPECIALIST
Payer: COMMERCIAL

## 2017-03-14 LAB
ALBUMIN SERPL BCP-MCNC: 3.8 G/DL (ref 3.5–4.8)
ALBUMIN/GLOB SERPL: 1.2 {RATIO} (ref 1–2)
ALP SERPL-CCNC: 71 U/L (ref 32–100)
ALT SERPL-CCNC: 37 U/L (ref 17–63)
ANION GAP SERPL CALC-SCNC: 8 MMOL/L (ref 0–18)
AST SERPL-CCNC: 24 U/L (ref 15–41)
BASOPHILS # BLD: 0 K/UL (ref 0–0.2)
BASOPHILS NFR BLD: 0 %
BILIRUB SERPL-MCNC: 0.5 MG/DL (ref 0.3–1.2)
BUN SERPL-MCNC: 14 MG/DL (ref 8–20)
BUN/CREAT SERPL: 14.9 (ref 10–20)
CALCIUM SERPL-MCNC: 9.3 MG/DL (ref 8.5–10.5)
CFT BLD TEG: 6.7 MIN (ref 5–10)
CFT BLD TEG: 7.9 MIN (ref 5–10)
CHLORIDE SERPL-SCNC: 105 MMOL/L (ref 95–110)
CLOT ANGLE BLD TEG: 67.7 DEG (ref 53–72)
CLOT ANGLE BLD TEG: 72.7 DEG (ref 53–72)
CLOT LYSIS 30M P MA LENFR BLD TEG: 3.8 % (ref 0–8)
CLOT LYSIS 30M P MA LENFR BLD TEG: 5.9 % (ref 0–8)
CLOT LYSIS ESTIMATE PRCTL BLD TEG: 6.1 MIN
CLOT LYSIS ESTIMATE PRCTL BLD TEG: 7.3 MIN
CLOT STRENGTH BLD TEG: 1.2 MIN (ref 1–3)
CLOT STRENGTH BLD TEG: 1.6 MIN (ref 1–3)
CLOT STRENGTH BLD TEG: 11.2 KD/SC (ref 4.5–11)
CLOT STRENGTH BLD TEG: 9.3 KD/SC (ref 4.5–11)
CO2 SERPL-SCNC: 27 MMOL/L (ref 22–32)
CREAT SERPL-MCNC: 0.94 MG/DL (ref 0.5–1.5)
EOSINOPHIL # BLD: 0.1 K/UL (ref 0–0.7)
EOSINOPHIL NFR BLD: 2 %
ERYTHROCYTE [DISTWIDTH] IN BLOOD BY AUTOMATED COUNT: 13.5 % (ref 11–15)
GLOBULIN PLAS-MCNC: 3.1 G/DL (ref 2.5–3.7)
GLUCOSE SERPL-MCNC: 107 MG/DL (ref 70–99)
HBA1C MFR BLD: 5.6 % (ref 4–6)
HCT VFR BLD AUTO: 38.6 % (ref 41–52)
HGB BLD-MCNC: 13 G/DL (ref 13.5–17.5)
INR BLD: 1 (ref 0.9–1.2)
LYMPHOCYTES # BLD: 1.7 K/UL (ref 1–4)
LYMPHOCYTES NFR BLD: 19 %
MAGNESIUM SERPL-MCNC: 1.9 MG/DL (ref 1.8–2.5)
MCF BLD TEG: 26.2 MM
MCF BLD TEG: 36 MM
MCF BLD TEG: 65.1 MM (ref 50–70)
MCF BLD TEG: 69.2 MM (ref 50–70)
MCH RBC QN AUTO: 29.9 PG (ref 27–32)
MCHC RBC AUTO-ENTMCNC: 33.7 G/DL (ref 32–37)
MCV RBC AUTO: 88.5 FL (ref 80–100)
MONOCYTES # BLD: 0.7 K/UL (ref 0–1)
MONOCYTES NFR BLD: 7 %
NEUTROPHILS # BLD AUTO: 6.6 K/UL (ref 1.8–7.7)
NEUTROPHILS NFR BLD: 72 %
OSMOLALITY UR CALC.SUM OF ELEC: 291 MOSM/KG (ref 275–295)
PFA-EPINEPHRINE: >300 SECONDS (ref ?–186)
PLATELET # BLD AUTO: 270 K/UL (ref 140–400)
PLATELET MAPPING % INHIBITION (AA): 100 %
PLATELET MAPPING % INHIBITION (ADP): 82 %
PMV BLD AUTO: 7.9 FL (ref 7.4–10.3)
POTASSIUM SERPL-SCNC: 4.3 MMOL/L (ref 3.3–5.1)
PROT SERPL-MCNC: 6.9 G/DL (ref 5.9–8.4)
PROTHROMBIN TIME: 12.8 SECONDS (ref 11.8–14.5)
RBC # BLD AUTO: 4.36 M/UL (ref 4.5–5.9)
SODIUM SERPL-SCNC: 140 MMOL/L (ref 136–144)
WBC # BLD AUTO: 9.1 K/UL (ref 4–11)

## 2017-03-14 PROCEDURE — 76770 US EXAM ABDO BACK WALL COMP: CPT

## 2017-03-14 PROCEDURE — 93306 TTE W/DOPPLER COMPLETE: CPT

## 2017-03-14 PROCEDURE — 93306 TTE W/DOPPLER COMPLETE: CPT | Performed by: INTERNAL MEDICINE

## 2017-03-14 PROCEDURE — 71020 XR CHEST PA + LAT CHEST (CPT=71020): CPT

## 2017-03-14 PROCEDURE — 99232 SBSQ HOSP IP/OBS MODERATE 35: CPT | Performed by: HOSPITALIST

## 2017-03-14 RX ORDER — HEPARIN SODIUM AND DEXTROSE 10000; 5 [USP'U]/100ML; G/100ML
INJECTION INTRAVENOUS CONTINUOUS
Status: DISCONTINUED | OUTPATIENT
Start: 2017-03-16 | End: 2017-03-15

## 2017-03-14 RX ORDER — LISINOPRIL 2.5 MG/1
2.5 TABLET ORAL DAILY
Status: DISCONTINUED | OUTPATIENT
Start: 2017-03-14 | End: 2017-03-15

## 2017-03-14 NOTE — SPIRITUAL CARE NOTE
Chp followed up at suggestion of Cris Kleinant () who recognized pt's name from earlier stay. Pt reported he is in for heart bypass surgery on Friday. Chp provided pastoral support through empathetic listening.  Pt and family member welcome regular

## 2017-03-14 NOTE — PAYOR COMM NOTE
Admit Orders     Start     Ordered    03/09/17 1114  Place patient in outpatient in a bed Once   Once     Ordering Provider:  Pablo Lovelace MD    03/09/17 1113        THIS PT DID NOT ADMIT AS INPATIENT, ONLY AN OUTPATIENT ON 3/9/17 & SENT HOME ON 3/9/17.

## 2017-03-14 NOTE — PROGRESS NOTES
[facility]    Progress Note    [address] [place] Patient Status:  Inpatient    10/9/1960 MRN [id number]   Location [facility] 2W/SW Attending Jamshid Busby MD   Hosp Day # 1 PCP No primary care provider on file.        Newton Velez cangrelor [facility]) bolus from bag, 30 mcg/kg, Intravenous, Once **FOLLOWED BY** cangrelor tetrasodium (KENGREAL) 200 mcg/mL in sodium chloride 0.9 % 250 mL IVPB, 0.75 mcg/kg/min, Intravenous, Continuous  •  temazepam (RESTORIL) cap 15 mg, 15 mg, Oral, Ni MD  3/14/2017

## 2017-03-14 NOTE — PLAN OF CARE
RESPIRATORY - ADULT    • Achieves optimal ventilation and oxygenation Not Progressing    Pt 02 sat is good while  he is awake but drops to 87-89% at  sleep  CARDIOVASCULAR - ADULT    • Maintains optimal cardiac output and hemodynamic stability Progressing

## 2017-03-14 NOTE — PLAN OF CARE
Problem: Patient Centered Care  Goal: Patient preferences are identified and integrated in the patient’s plan of care  Interventions:  - What would you like us to know as we care for you?   - Provide timely, complete, and accurate information to patient/fa assistance. No falls/injury noted at this time.  CPM    Problem: CARDIOVASCULAR - ADULT  Goal: Maintains optimal cardiac output and hemodynamic stability  INTERVENTIONS:  - Monitor vital signs, rhythm, and trends  - Monitor for bleeding, hypotension and sig

## 2017-03-14 NOTE — PROGRESS NOTES
[facility]  Cardiology Progress Note    Indiragiselle Murray Patient Status:  Inpatient    10/9/1960 MRN [id number]   Location [facility] 2W/SW Attending Tracy Allen MD   Hosp Day # 1 PCP No primary care provider on file.        Ulises Molina Allergies    Medications:    Current Facility-Administered Medications:  lisinopril (PRINIVIL,ZESTRIL) tab 2.5 mg 2.5 mg Oral Daily   0.9%  NaCl infusion  Intravenous Continuous   acetaminophen (TYLENOL) tab 650 mg 650 mg Oral Q6H PRN   docusate sodium (CO

## 2017-03-14 NOTE — PLAN OF CARE
CARDIOVASCULAR - ADULT    • Maintains optimal cardiac output and hemodynamic stability Progressing    Pt monitor shows SB his B.P is WNL    HEMATOLOGIC - ADULT    • Maintains hematologic stability Progressing    • Free from bleeding injury Progressing    P

## 2017-03-14 NOTE — PLAN OF CARE
SKIN/TISSUE INTEGRITY - ADULT    • Skin integrity remains intact Completed    Pt skin is intact his R radial ,and R femoral puncture sites are cdi

## 2017-03-15 ENCOUNTER — ANESTHESIA EVENT (OUTPATIENT)
Dept: SURGERY | Facility: HOSPITAL | Age: 57
DRG: 236 | End: 2017-03-15
Payer: COMMERCIAL

## 2017-03-15 LAB
ANION GAP SERPL CALC-SCNC: 5 MMOL/L (ref 0–18)
ANTIBODY SCREEN: NEGATIVE
BASOPHILS # BLD: 0 K/UL (ref 0–0.2)
BASOPHILS NFR BLD: 0 %
BUN SERPL-MCNC: 13 MG/DL (ref 8–20)
BUN/CREAT SERPL: 14 (ref 10–20)
CALCIUM SERPL-MCNC: 9.3 MG/DL (ref 8.5–10.5)
CHLORIDE SERPL-SCNC: 106 MMOL/L (ref 95–110)
CO2 SERPL-SCNC: 27 MMOL/L (ref 22–32)
CREAT SERPL-MCNC: 0.93 MG/DL (ref 0.5–1.5)
EOSINOPHIL # BLD: 0.2 K/UL (ref 0–0.7)
EOSINOPHIL NFR BLD: 2 %
ERYTHROCYTE [DISTWIDTH] IN BLOOD BY AUTOMATED COUNT: 13.5 % (ref 11–15)
GLUCOSE SERPL-MCNC: 118 MG/DL (ref 70–99)
HCT VFR BLD AUTO: 35.3 % (ref 41–52)
HGB BLD-MCNC: 11.8 G/DL (ref 13.5–17.5)
LYMPHOCYTES # BLD: 1.3 K/UL (ref 1–4)
LYMPHOCYTES NFR BLD: 14 %
MCH RBC QN AUTO: 29.5 PG (ref 27–32)
MCHC RBC AUTO-ENTMCNC: 33.5 G/DL (ref 32–37)
MCV RBC AUTO: 88 FL (ref 80–100)
MONOCYTES # BLD: 0.9 K/UL (ref 0–1)
MONOCYTES NFR BLD: 10 %
NEUTROPHILS # BLD AUTO: 6.6 K/UL (ref 1.8–7.7)
NEUTROPHILS NFR BLD: 74 %
OSMOLALITY UR CALC.SUM OF ELEC: 287 MOSM/KG (ref 275–295)
PLATELET # BLD AUTO: 225 K/UL (ref 140–400)
PMV BLD AUTO: 7.5 FL (ref 7.4–10.3)
POTASSIUM SERPL-SCNC: 4.3 MMOL/L (ref 3.3–5.1)
RBC # BLD AUTO: 4.01 M/UL (ref 4.5–5.9)
RH BLOOD TYPE: POSITIVE
SODIUM SERPL-SCNC: 138 MMOL/L (ref 136–144)
WBC # BLD AUTO: 8.9 K/UL (ref 4–11)

## 2017-03-15 PROCEDURE — 99233 SBSQ HOSP IP/OBS HIGH 50: CPT | Performed by: HOSPITALIST

## 2017-03-15 RX ORDER — ASCORBIC ACID 500 MG
1000 TABLET ORAL ONCE
Status: COMPLETED | OUTPATIENT
Start: 2017-03-15 | End: 2017-03-15

## 2017-03-15 RX ORDER — HEPARIN SODIUM AND DEXTROSE 10000; 5 [USP'U]/100ML; G/100ML
INJECTION INTRAVENOUS CONTINUOUS
Status: DISCONTINUED | OUTPATIENT
Start: 2017-03-16 | End: 2017-03-16

## 2017-03-15 RX ORDER — METOCLOPRAMIDE 10 MG/1
10 TABLET ORAL ONCE
Status: CANCELLED | OUTPATIENT
Start: 2017-03-15 | End: 2017-03-15

## 2017-03-15 RX ORDER — SODIUM CHLORIDE 9 MG/ML
83 INJECTION, SOLUTION INTRAVENOUS CONTINUOUS
Status: CANCELLED | OUTPATIENT
Start: 2017-03-16

## 2017-03-15 RX ORDER — MORPHINE SULFATE 2 MG/ML
2 INJECTION, SOLUTION INTRAMUSCULAR; INTRAVENOUS ONCE
Status: CANCELLED | OUTPATIENT
Start: 2017-03-15 | End: 2017-03-15

## 2017-03-15 RX ORDER — FAMOTIDINE 20 MG/1
20 TABLET ORAL ONCE
Status: CANCELLED | OUTPATIENT
Start: 2017-03-15 | End: 2017-03-15

## 2017-03-15 RX ORDER — LORAZEPAM 1 MG/1
1 TABLET ORAL ONCE
Status: CANCELLED | OUTPATIENT
Start: 2017-03-15 | End: 2017-03-15

## 2017-03-15 NOTE — PROGRESS NOTES
Misc. Note    Pt. Scheduled for CABG tomorrow with Dr. Narciso Gomez. Written and verbal info provided to pt. Regarding pipo-op expectations. All questions answered. Reviewed STS risk score.  Plan to stop IV Cangrelor at 3am and initiate IV low dose Heparin abhinav

## 2017-03-15 NOTE — PROGRESS NOTES
03/15/17 1540   Clinical Encounter Type   Visited With Patient and family together   Routine Visit Follow-up   Continue Visiting Yes   Surgical Visit Pre-op   Patient's Supportive Strategies/Resources family, friends   Patient Spiritual Encounters   Spi

## 2017-03-15 NOTE — ANESTHESIA PREPROCEDURE EVALUATION
Anesthesia PreOp Note    HPI:     [address] is a 60year old male who presents for preoperative consultation requested by: Estrella Connors MD    Date of Surgery: 3/16/2017    Procedure(s):   HEART CORONARY ARTERY BYPASS GRAFT  Indication: Coron 3350 (MIRALAX) powder packet 17 g 17 g Oral Daily PRN Valeria Gaines MD     magnesium hydroxide (MILK OF MAGNESIA) 400 MG/5ML suspension 30 mL 30 mL Oral Daily PRN Valeria Gaines MD     bisacodyl (DULCOLAX) rectal suppository 10 mg 10 mg Rectal D Narrative       Available pre-op labs reviewed.     Lab Results  Component Value Date   WBC 8.9 03/15/2017   RBC 4.01* 03/15/2017   HGB 11.8* 03/15/2017   HCT 35.3* 03/15/2017   MCV 88.0 03/15/2017   MCH 29.5 03/15/2017   MCHC 33.5 03/15/2017   RDW 13.5 03/ planned.   Sanjana Glover  3/15/2017 1:50 PM

## 2017-03-15 NOTE — PROGRESS NOTES
[facility] AND [facility]  Progress Note    [address] [facility] Patient Status:  Inpatient    10/9/1960 MRN [id number]   Location [facility] 2W/SW Attending Reva Dowling MD   Hosp Day # 2 PCP No primary care provider on file. Assessment:    1.  CAD  03/15/2017   CA 9.3 03/15/2017          Lab Results  Component Value Date   TROP 0.36* 02/13/2017        Medications:    • lisinopril  2.5 mg Oral Daily   • docusate sodium  100 mg Oral BID   • Atorvastatin Calcium  40 mg Oral Nightly   • cetiriz

## 2017-03-15 NOTE — PROGRESS NOTES
[facility]    Progress Note    [address] [facility] Patient Status:  Inpatient    10/9/1960 MRN [id number]   Location [facility] 2W/SW Attending Jerri Carmona MD     Hosp Day # 2 PCP No primary care provider on file.        Subject suspension 30 mL, 30 mL, Oral, Daily PRN  •  bisacodyl (DULCOLAX) rectal suppository 10 mg, 10 mg, Rectal, Daily PRN  •  ondansetron HCl (ZOFRAN) injection 4 mg, 4 mg, Intravenous, Q6H PRN  •  Atorvastatin Calcium (LIPITOR) tab 40 mg, 40 mg, Oral, Nightly (cpt=76770)    3/14/2017  CONCLUSION:  1. Minimal atheromatous changes without aortoiliac aneurysmal dilatation.                 Niesha Lorenzana MD

## 2017-03-16 ENCOUNTER — APPOINTMENT (OUTPATIENT)
Dept: GENERAL RADIOLOGY | Facility: HOSPITAL | Age: 57
DRG: 236 | End: 2017-03-16
Attending: NURSE PRACTITIONER
Payer: COMMERCIAL

## 2017-03-16 ENCOUNTER — SURGERY (OUTPATIENT)
Age: 57
End: 2017-03-16

## 2017-03-16 ENCOUNTER — ANESTHESIA (OUTPATIENT)
Dept: SURGERY | Facility: HOSPITAL | Age: 57
DRG: 236 | End: 2017-03-16
Payer: COMMERCIAL

## 2017-03-16 LAB
ANION GAP SERPL CALC-SCNC: 6 MMOL/L (ref 0–18)
ANION GAP SERPL CALC-SCNC: 8 MMOL/L (ref 0–18)
APTT PPP: 37.8 SECONDS (ref 23.2–35.3)
APTT PPP: 61 SECONDS (ref 23.2–35.3)
BASE EXCESS BLD CALC-SCNC: -1 MMOL/L (ref ?–2)
BASE EXCESS BLD CALC-SCNC: -3.1 MMOL/L (ref ?–2)
BASE EXCESS BLD CALC-SCNC: -3.4 MMOL/L (ref ?–2)
BASE EXCESS BLD CALC-SCNC: -3.4 MMOL/L (ref ?–2)
BASE EXCESS BLD CALC-SCNC: 0.6 MMOL/L (ref ?–2)
BASOPHILS # BLD: 0.1 K/UL (ref 0–0.2)
BASOPHILS NFR BLD: 0 %
BUN SERPL-MCNC: 11 MG/DL (ref 8–20)
BUN SERPL-MCNC: 13 MG/DL (ref 8–20)
BUN/CREAT SERPL: 11.6 (ref 10–20)
BUN/CREAT SERPL: 15.3 (ref 10–20)
CA-I BLD-SCNC: 1.11 MMOL/L (ref 0.95–1.32)
CA-I BLD-SCNC: 1.17 MMOL/L (ref 0.95–1.32)
CA-I BLD-SCNC: 1.25 MMOL/L (ref 0.95–1.32)
CA-I BLD-SCNC: 1.27 MMOL/L (ref 0.95–1.32)
CA-I BLD-SCNC: 1.29 MMOL/L (ref 0.95–1.32)
CALCIUM SERPL-MCNC: 7.9 MG/DL (ref 8.5–10.5)
CALCIUM SERPL-MCNC: 9.3 MG/DL (ref 8.5–10.5)
CFT BLD TEG: 17.8 MIN (ref 5–10)
CFT P HPASE BLD TEG: 20.4 MIN (ref 5–10)
CFT P HPASE BLD TEG: 7.7 MIN (ref 5–10)
CFT P HPASE BLD TEG: 8.3 MIN (ref 5–10)
CHLORIDE SERPL-SCNC: 106 MMOL/L (ref 95–110)
CHLORIDE SERPL-SCNC: 111 MMOL/L (ref 95–110)
CLOT ANGLE BLD TEG: 51.9 DEG (ref 53–72)
CLOT ANGLE P HPASE BLD TEG: 43.3 DEG (ref 53–72)
CLOT ANGLE P HPASE BLD TEG: 69.7 DEG (ref 53–72)
CLOT ANGLE P HPASE BLD TEG: 73.8 DEG (ref 53–72)
CLOT LYSIS 30M P MA LENFR BLD TEG: 0.8 % (ref 0–8)
CLOT LYSIS 30M P MA LENFR BLD TEG: 1.2 % (ref 0–8)
CLOT LYSIS ESTIMATE PRCTL BLD TEG: 16.3 MIN
CLOT STRENGTH BLD TEG: 3.1 MIN (ref 1–3)
CLOT STRENGTH BLD TEG: 8.3 KD/SC (ref 4.5–11)
CLOT STRENGTH P HPASE BLD TEG: 1.2 MIN (ref 1–3)
CLOT STRENGTH P HPASE BLD TEG: 1.5 MIN (ref 1–3)
CLOT STRENGTH P HPASE BLD TEG: 11 KD/SC (ref 4.5–11)
CLOT STRENGTH P HPASE BLD TEG: 13.3 KD/SC (ref 4.5–11)
CLOT STRENGTH P HPASE BLD TEG: 17.6 MIN
CLOT STRENGTH P HPASE BLD TEG: 4.2 MIN (ref 1–3)
CLOT STRENGTH P HPASE BLD TEG: 7.2 MIN
CLOT STRENGTH P HPASE BLD TEG: 7.8 MIN
CLOT STRENGTH P HPASE BLD TEG: 8 KD/SC (ref 4.5–11)
CO2 SERPL-SCNC: 25 MMOL/L (ref 22–32)
CO2 SERPL-SCNC: 27 MMOL/L (ref 22–32)
COHGB MFR BLD: 1.5 % (ref 0–1.5)
COHGB MFR BLD: 2.3 % (ref 0–1.5)
COHGB MFR BLD: 2.4 % (ref 0–1.5)
CREAT SERPL-MCNC: 0.85 MG/DL (ref 0.5–1.5)
CREAT SERPL-MCNC: 0.95 MG/DL (ref 0.5–1.5)
EOSINOPHIL # BLD: 0.2 K/UL (ref 0–0.7)
EOSINOPHIL NFR BLD: 1 %
ERYTHROCYTE [DISTWIDTH] IN BLOOD BY AUTOMATED COUNT: 13.3 % (ref 11–15)
ERYTHROCYTE [DISTWIDTH] IN BLOOD BY AUTOMATED COUNT: 13.5 % (ref 11–15)
ERYTHROCYTE [DISTWIDTH] IN BLOOD BY AUTOMATED COUNT: 13.8 % (ref 11–15)
FIBRINOGEN PPP-MCNC: 360 MG/DL (ref 176–491)
GLUCOSE BLDC GLUCOMTR-MCNC: 103 MG/DL (ref 70–99)
GLUCOSE BLDC GLUCOMTR-MCNC: 109 MG/DL (ref 70–99)
GLUCOSE BLDC GLUCOMTR-MCNC: 111 MG/DL (ref 70–99)
GLUCOSE BLDC GLUCOMTR-MCNC: 120 MG/DL (ref 70–99)
GLUCOSE BLDC GLUCOMTR-MCNC: 133 MG/DL (ref 70–99)
GLUCOSE BLDC GLUCOMTR-MCNC: 137 MG/DL (ref 70–99)
GLUCOSE BLDC GLUCOMTR-MCNC: 137 MG/DL (ref 70–99)
GLUCOSE BLDC GLUCOMTR-MCNC: 149 MG/DL (ref 70–99)
GLUCOSE BLDC GLUCOMTR-MCNC: 149 MG/DL (ref 70–99)
GLUCOSE BLDC GLUCOMTR-MCNC: 154 MG/DL (ref 70–99)
GLUCOSE BLDC GLUCOMTR-MCNC: 154 MG/DL (ref 70–99)
GLUCOSE BLDC GLUCOMTR-MCNC: 156 MG/DL (ref 70–99)
GLUCOSE BLDC GLUCOMTR-MCNC: 160 MG/DL (ref 70–99)
GLUCOSE BLDC GLUCOMTR-MCNC: 169 MG/DL (ref 70–99)
GLUCOSE BLDC GLUCOMTR-MCNC: 174 MG/DL (ref 70–99)
GLUCOSE SERPL-MCNC: 115 MG/DL (ref 70–99)
GLUCOSE SERPL-MCNC: 116 MG/DL (ref 70–99)
HCO3 BLDA-SCNC: 21.5 MEQ/L (ref 21–27)
HCO3 BLDA-SCNC: 22.3 MEQ/L (ref 21–27)
HCO3 BLDA-SCNC: 23 MEQ/L (ref 21–27)
HCO3 BLDA-SCNC: 23.5 MEQ/L (ref 21–27)
HCO3 BLDA-SCNC: 24.7 MEQ/L (ref 21–27)
HCT VFR BLD AUTO: 32.3 % (ref 41–52)
HCT VFR BLD AUTO: 32.5 % (ref 41–52)
HCT VFR BLD AUTO: 35.6 % (ref 41–52)
HGB BLD-MCNC: 10.5 G/DL (ref 13.5–17.5)
HGB BLD-MCNC: 11 G/DL (ref 13.5–17.5)
HGB BLD-MCNC: 11.1 G/DL (ref 13.5–17.5)
HGB BLD-MCNC: 12.1 G/DL (ref 13.5–17.5)
HGB BLD-MCNC: 6.9 G/DL (ref 13.5–17.5)
HGB BLD-MCNC: 7 G/DL (ref 13.5–17.5)
HGB BLD-MCNC: 7.1 G/DL (ref 13.5–17.5)
HGB BLD-MCNC: 9.9 G/DL (ref 13.5–17.5)
INR BLD: 1.6 (ref 0.9–1.2)
LYMPHOCYTES # BLD: 1.4 K/UL (ref 1–4)
LYMPHOCYTES NFR BLD: 7 %
MAGNESIUM SERPL-MCNC: 2 MG/DL (ref 1.8–2.5)
MAGNESIUM SERPL-MCNC: 2.5 MG/DL (ref 1.8–2.5)
MCF BLD TEG: 62.4 MM (ref 50–70)
MCF BLD TEG: 68.4 MM
MCF BLD TEG: 73.7 MM
MCF BLD TEG: 74.4 MM
MCF BLD TEG: 76 MM
MCF P HPASE BLD TEG: 61.4 MM (ref 50–70)
MCF P HPASE BLD TEG: 68.7 MM (ref 50–70)
MCF P HPASE BLD TEG: 72.7 MM (ref 50–70)
MCH RBC QN AUTO: 29.5 PG (ref 27–32)
MCH RBC QN AUTO: 29.6 PG (ref 27–32)
MCH RBC QN AUTO: 29.8 PG (ref 27–32)
MCHC RBC AUTO-ENTMCNC: 33.8 G/DL (ref 32–37)
MCHC RBC AUTO-ENTMCNC: 34.1 G/DL (ref 32–37)
MCHC RBC AUTO-ENTMCNC: 34.1 G/DL (ref 32–37)
MCV RBC AUTO: 86.5 FL (ref 80–100)
MCV RBC AUTO: 87 FL (ref 80–100)
MCV RBC AUTO: 88 FL (ref 80–100)
METHGB MFR BLD: 0 % SAT (ref 0.4–1.5)
METHGB MFR BLD: 0.7 % SAT (ref 0.4–1.5)
METHGB MFR BLD: 1 % SAT (ref 0.4–1.5)
MONOCYTES # BLD: 1.1 K/UL (ref 0–1)
MONOCYTES NFR BLD: 6 %
NEUTROPHILS # BLD AUTO: 17.9 K/UL (ref 1.8–7.7)
NEUTROPHILS NFR BLD: 86 %
O2 CT BLD-SCNC: 10.1 VOL% (ref 15–23)
O2 CT BLD-SCNC: 10.8 VOL% (ref 15–23)
O2 CT BLD-SCNC: 14.8 VOL% (ref 15–23)
O2 CT BLD-SCNC: 14.8 VOL% (ref 15–23)
O2 CT BLD-SCNC: 9.8 VOL% (ref 15–23)
O2/TOTAL GAS SETTING VFR VENT: 60 %
OSMOLALITY UR CALC.SUM OF ELEC: 293 MOSM/KG (ref 275–295)
OSMOLALITY UR CALC.SUM OF ELEC: 294 MOSM/KG (ref 275–295)
PCO2 BLDA: 40 MM HG (ref 35–45)
PCO2 BLDA: 41 MM HG (ref 35–45)
PCO2 BLDA: 41 MM HG (ref 35–45)
PCO2 BLDA: 46 MM HG (ref 35–45)
PCO2 BLDA: 54 MM HG (ref 35–45)
PEEP SETTING VENT: 5 CM H2O
PH BLDA: 7.26 [PH] (ref 7.35–7.45)
PH BLDA: 7.31 [PH] (ref 7.35–7.45)
PH BLDA: 7.34 [PH] (ref 7.35–7.45)
PH BLDA: 7.38 [PH] (ref 7.35–7.45)
PH BLDA: 7.41 [PH] (ref 7.35–7.45)
PLATELET # BLD AUTO: 104 K/UL (ref 140–400)
PLATELET # BLD AUTO: 105 K/UL (ref 140–400)
PLATELET # BLD AUTO: 246 K/UL (ref 140–400)
PLATELET MAPPING % INHIBITION (AA): 0 %
PLATELET MAPPING % INHIBITION (AA): 8 %
PLATELET MAPPING % INHIBITION (ADP): 0 %
PLATELET MAPPING % INHIBITION (ADP): 0 %
PMV BLD AUTO: 6.9 FL (ref 7.4–10.3)
PMV BLD AUTO: 7.1 FL (ref 7.4–10.3)
PMV BLD AUTO: 8 FL (ref 7.4–10.3)
PO2 BLDA: 188 MM HG (ref 80–100)
PO2 BLDA: 190 MM HG (ref 80–100)
PO2 BLDA: 216 MM HG (ref 80–100)
PO2 BLDA: 384 MM HG (ref 80–100)
PO2 BLDA: 429 MM HG (ref 80–100)
PO2 SATN ADJ TO 0.5 BLD: 26 MMHG (ref 24–28)
PO2 SATN ADJ TO 0.5 BLD: 27 MMHG (ref 24–28)
PO2 SATN ADJ TO 0.5 BLD: 28 MMHG (ref 24–28)
PO2 SATN ADJ TO 0.5 BLD: 29 MMHG (ref 24–28)
PO2 SATN ADJ TO 0.5 BLD: 30 MMHG (ref 24–28)
POTASSIUM (BLOOD GAS): 3.7 MMOL/L (ref 3.3–5.1)
POTASSIUM (BLOOD GAS): 4.2 MMOL/L (ref 3.3–5.1)
POTASSIUM (BLOOD GAS): 4.5 MMOL/L (ref 3.3–5.1)
POTASSIUM (BLOOD GAS): 4.5 MMOL/L (ref 3.3–5.1)
POTASSIUM (BLOOD GAS): 5.4 MMOL/L (ref 3.3–5.1)
POTASSIUM SERPL-SCNC: 3.6 MMOL/L (ref 3.3–5.1)
POTASSIUM SERPL-SCNC: 3.7 MMOL/L (ref 3.3–5.1)
POTASSIUM SERPL-SCNC: 4.2 MMOL/L (ref 3.3–5.1)
PRESSURE SUPPORT SETTING VENT: 10 CM H2O
PROTHROMBIN TIME: 19 SECONDS (ref 11.8–14.5)
PUNCTURE CHARGE: NO
RBC # BLD AUTO: 3.73 M/UL (ref 4.5–5.9)
RBC # BLD AUTO: 3.74 M/UL (ref 4.5–5.9)
RBC # BLD AUTO: 4.05 M/UL (ref 4.5–5.9)
RESP RATE: 10 BPM
SAO2 % BLDA: 98.5 % (ref 94–100)
SAO2 % BLDA: >98.5 % (ref 94–100)
SODIUM (BLOOD GAS): 145 MMOL/L (ref 135–145)
SODIUM (BLOOD GAS): 146 MMOL/L (ref 135–145)
SODIUM (BLOOD GAS): 146 MMOL/L (ref 135–145)
SODIUM (BLOOD GAS): 147 MMOL/L (ref 135–145)
SODIUM (BLOOD GAS): 147 MMOL/L (ref 135–145)
SODIUM SERPL-SCNC: 141 MMOL/L (ref 136–144)
SODIUM SERPL-SCNC: 142 MMOL/L (ref 136–144)
SPECIMEN VOL 24H UR: 700 ML
TSH SERPL-ACNC: 2.45 UIU/ML (ref 0.34–5.6)
WBC # BLD AUTO: 20.6 K/UL (ref 4–11)
WBC # BLD AUTO: 20.7 K/UL (ref 4–11)
WBC # BLD AUTO: 7.4 K/UL (ref 4–11)

## 2017-03-16 PROCEDURE — 06BQ4ZZ EXCISION OF LEFT SAPHENOUS VEIN, PERCUTANEOUS ENDOSCOPIC APPROACH: ICD-10-PCS | Performed by: THORACIC SURGERY (CARDIOTHORACIC VASCULAR SURGERY)

## 2017-03-16 PROCEDURE — 02100Z9 BYPASS CORONARY ARTERY, ONE ARTERY FROM LEFT INTERNAL MAMMARY, OPEN APPROACH: ICD-10-PCS | Performed by: THORACIC SURGERY (CARDIOTHORACIC VASCULAR SURGERY)

## 2017-03-16 PROCEDURE — B24BZZ4 ULTRASONOGRAPHY OF HEART WITH AORTA, TRANSESOPHAGEAL: ICD-10-PCS | Performed by: THORACIC SURGERY (CARDIOTHORACIC VASCULAR SURGERY)

## 2017-03-16 PROCEDURE — 99255 IP/OBS CONSLTJ NEW/EST HI 80: CPT | Performed by: INTERNAL MEDICINE

## 2017-03-16 PROCEDURE — 5A1223Z PERFORMANCE OF CARDIAC PACING, CONTINUOUS: ICD-10-PCS | Performed by: THORACIC SURGERY (CARDIOTHORACIC VASCULAR SURGERY)

## 2017-03-16 PROCEDURE — 021109W BYPASS CORONARY ARTERY, TWO ARTERIES FROM AORTA WITH AUTOLOGOUS VENOUS TISSUE, OPEN APPROACH: ICD-10-PCS | Performed by: THORACIC SURGERY (CARDIOTHORACIC VASCULAR SURGERY)

## 2017-03-16 PROCEDURE — 5A1221Z PERFORMANCE OF CARDIAC OUTPUT, CONTINUOUS: ICD-10-PCS | Performed by: THORACIC SURGERY (CARDIOTHORACIC VASCULAR SURGERY)

## 2017-03-16 PROCEDURE — 93312 ECHO TRANSESOPHAGEAL: CPT | Performed by: ANESTHESIOLOGY

## 2017-03-16 PROCEDURE — 71010 XR CHEST AP PORTABLE  (CPT=71010): CPT

## 2017-03-16 DEVICE — FLOSEAL SEALENT STERILE 10ML: Type: IMPLANTABLE DEVICE | Site: CHEST | Status: FUNCTIONAL

## 2017-03-16 RX ORDER — ALBUMIN, HUMAN INJ 5% 5 %
SOLUTION INTRAVENOUS
Status: COMPLETED
Start: 2017-03-16 | End: 2017-03-16

## 2017-03-16 RX ORDER — GLYCOPYRROLATE 0.2 MG/ML
0.6 INJECTION, SOLUTION INTRAMUSCULAR; INTRAVENOUS ONCE
Status: COMPLETED | OUTPATIENT
Start: 2017-03-16 | End: 2017-03-16

## 2017-03-16 RX ORDER — ONDANSETRON 2 MG/ML
4 INJECTION INTRAMUSCULAR; INTRAVENOUS EVERY 6 HOURS PRN
Status: DISCONTINUED | OUTPATIENT
Start: 2017-03-16 | End: 2017-03-20

## 2017-03-16 RX ORDER — ALBUMIN, HUMAN INJ 5% 5 %
250 SOLUTION INTRAVENOUS ONCE
Status: COMPLETED | OUTPATIENT
Start: 2017-03-16 | End: 2017-03-16

## 2017-03-16 RX ORDER — LORAZEPAM 1 MG/1
1 TABLET ORAL ONCE
Status: DISCONTINUED | OUTPATIENT
Start: 2017-03-16 | End: 2017-03-16

## 2017-03-16 RX ORDER — EPHEDRINE SULFATE 50 MG/ML
INJECTION, SOLUTION INTRAVENOUS AS NEEDED
Status: DISCONTINUED | OUTPATIENT
Start: 2017-03-16 | End: 2017-03-16 | Stop reason: SURG

## 2017-03-16 RX ORDER — METOCLOPRAMIDE 10 MG/1
10 TABLET ORAL ONCE
Status: COMPLETED | OUTPATIENT
Start: 2017-03-16 | End: 2017-03-16

## 2017-03-16 RX ORDER — ALBUMIN, HUMAN INJ 5% 5 %
250 SOLUTION INTRAVENOUS ONCE AS NEEDED
Status: COMPLETED | OUTPATIENT
Start: 2017-03-16 | End: 2017-03-16

## 2017-03-16 RX ORDER — DEXTROSE, SODIUM CHLORIDE, SODIUM LACTATE, POTASSIUM CHLORIDE, AND CALCIUM CHLORIDE 5; .6; .31; .03; .02 G/100ML; G/100ML; G/100ML; G/100ML; G/100ML
INJECTION, SOLUTION INTRAVENOUS CONTINUOUS
Status: DISCONTINUED | OUTPATIENT
Start: 2017-03-16 | End: 2017-03-17

## 2017-03-16 RX ORDER — METOCLOPRAMIDE HYDROCHLORIDE 5 MG/ML
10 INJECTION INTRAMUSCULAR; INTRAVENOUS EVERY 6 HOURS
Status: DISCONTINUED | OUTPATIENT
Start: 2017-03-16 | End: 2017-03-19

## 2017-03-16 RX ORDER — FAMOTIDINE 20 MG/1
20 TABLET ORAL 2 TIMES DAILY
Status: DISCONTINUED | OUTPATIENT
Start: 2017-03-16 | End: 2017-03-16

## 2017-03-16 RX ORDER — SODIUM CHLORIDE 0.9 % (FLUSH) 0.9 %
10 SYRINGE (ML) INJECTION AS NEEDED
Status: DISCONTINUED | OUTPATIENT
Start: 2017-03-16 | End: 2017-03-20

## 2017-03-16 RX ORDER — ALBUMIN, HUMAN INJ 5% 5 %
SOLUTION INTRAVENOUS
Status: DISPENSED
Start: 2017-03-16 | End: 2017-03-17

## 2017-03-16 RX ORDER — FUROSEMIDE 10 MG/ML
INJECTION INTRAMUSCULAR; INTRAVENOUS AS NEEDED
Status: DISCONTINUED | OUTPATIENT
Start: 2017-03-16 | End: 2017-03-16 | Stop reason: SURG

## 2017-03-16 RX ORDER — SODIUM CHLORIDE, SODIUM LACTATE, POTASSIUM CHLORIDE, CALCIUM CHLORIDE 600; 310; 30; 20 MG/100ML; MG/100ML; MG/100ML; MG/100ML
INJECTION, SOLUTION INTRAVENOUS CONTINUOUS PRN
Status: DISCONTINUED | OUTPATIENT
Start: 2017-03-16 | End: 2017-03-16

## 2017-03-16 RX ORDER — MORPHINE SULFATE 4 MG/ML
4 INJECTION, SOLUTION INTRAMUSCULAR; INTRAVENOUS
Status: DISCONTINUED | OUTPATIENT
Start: 2017-03-16 | End: 2017-03-16

## 2017-03-16 RX ORDER — LORAZEPAM 1 MG/1
1 TABLET ORAL ONCE
Status: COMPLETED | OUTPATIENT
Start: 2017-03-16 | End: 2017-03-16

## 2017-03-16 RX ORDER — DOBUTAMINE HYDROCHLORIDE 100 MG/100ML
INJECTION INTRAVENOUS CONTINUOUS PRN
Status: DISCONTINUED | OUTPATIENT
Start: 2017-03-16 | End: 2017-03-18

## 2017-03-16 RX ORDER — DOXEPIN HYDROCHLORIDE 50 MG/1
1 CAPSULE ORAL DAILY
Status: DISCONTINUED | OUTPATIENT
Start: 2017-03-17 | End: 2017-03-20

## 2017-03-16 RX ORDER — HEPARIN SODIUM 1000 [USP'U]/ML
INJECTION, SOLUTION INTRAVENOUS; SUBCUTANEOUS AS NEEDED
Status: DISCONTINUED | OUTPATIENT
Start: 2017-03-16 | End: 2017-03-16 | Stop reason: HOSPADM

## 2017-03-16 RX ORDER — HYDROCODONE BITARTRATE AND ACETAMINOPHEN 5; 325 MG/1; MG/1
1 TABLET ORAL EVERY 4 HOURS PRN
Status: DISCONTINUED | OUTPATIENT
Start: 2017-03-16 | End: 2017-03-18

## 2017-03-16 RX ORDER — CHLORHEXIDINE GLUCONATE 0.12 MG/ML
15 RINSE ORAL
Status: DISCONTINUED | OUTPATIENT
Start: 2017-03-16 | End: 2017-03-20

## 2017-03-16 RX ORDER — TEMAZEPAM 15 MG/1
15 CAPSULE ORAL NIGHTLY PRN
Status: DISCONTINUED | OUTPATIENT
Start: 2017-03-16 | End: 2017-03-17

## 2017-03-16 RX ORDER — ACETAMINOPHEN 325 MG/1
650 TABLET ORAL EVERY 4 HOURS PRN
Status: DISCONTINUED | OUTPATIENT
Start: 2017-03-16 | End: 2017-03-20

## 2017-03-16 RX ORDER — NITROGLYCERIN 20 MG/100ML
INJECTION INTRAVENOUS CONTINUOUS PRN
Status: DISCONTINUED | OUTPATIENT
Start: 2017-03-16 | End: 2017-03-18

## 2017-03-16 RX ORDER — FAMOTIDINE 10 MG/ML
20 INJECTION, SOLUTION INTRAVENOUS 2 TIMES DAILY
Status: DISCONTINUED | OUTPATIENT
Start: 2017-03-16 | End: 2017-03-16

## 2017-03-16 RX ORDER — PROTAMINE SULFATE 10 MG/ML
INJECTION, SOLUTION INTRAVENOUS AS NEEDED
Status: DISCONTINUED | OUTPATIENT
Start: 2017-03-16 | End: 2017-03-16 | Stop reason: SURG

## 2017-03-16 RX ORDER — MORPHINE SULFATE 2 MG/ML
2 INJECTION, SOLUTION INTRAMUSCULAR; INTRAVENOUS ONCE
Status: COMPLETED | OUTPATIENT
Start: 2017-03-16 | End: 2017-03-16

## 2017-03-16 RX ORDER — MORPHINE SULFATE 4 MG/ML
4 INJECTION, SOLUTION INTRAMUSCULAR; INTRAVENOUS EVERY 2 HOUR PRN
Status: DISCONTINUED | OUTPATIENT
Start: 2017-03-16 | End: 2017-03-20

## 2017-03-16 RX ORDER — FAMOTIDINE 10 MG/ML
20 INJECTION, SOLUTION INTRAVENOUS 2 TIMES DAILY
Status: DISCONTINUED | OUTPATIENT
Start: 2017-03-16 | End: 2017-03-19

## 2017-03-16 RX ORDER — ASPIRIN 81 MG/1
81 TABLET ORAL DAILY
Status: DISCONTINUED | OUTPATIENT
Start: 2017-03-17 | End: 2017-03-20

## 2017-03-16 RX ORDER — CEFAZOLIN SODIUM 1 G/3ML
INJECTION, POWDER, FOR SOLUTION INTRAMUSCULAR; INTRAVENOUS AS NEEDED
Status: DISCONTINUED | OUTPATIENT
Start: 2017-03-16 | End: 2017-03-16 | Stop reason: HOSPADM

## 2017-03-16 RX ORDER — MORPHINE SULFATE 2 MG/ML
2 INJECTION, SOLUTION INTRAMUSCULAR; INTRAVENOUS ONCE
Status: DISCONTINUED | OUTPATIENT
Start: 2017-03-16 | End: 2017-03-16

## 2017-03-16 RX ORDER — HEPARIN SODIUM 1000 [USP'U]/ML
INJECTION, SOLUTION INTRAVENOUS; SUBCUTANEOUS AS NEEDED
Status: DISCONTINUED | OUTPATIENT
Start: 2017-03-16 | End: 2017-03-16 | Stop reason: SURG

## 2017-03-16 RX ORDER — 0.9 % SODIUM CHLORIDE 0.9 %
VIAL (ML) INJECTION
Status: DISPENSED
Start: 2017-03-16 | End: 2017-03-17

## 2017-03-16 RX ORDER — CLOPIDOGREL BISULFATE 75 MG/1
75 TABLET ORAL DAILY
Status: DISCONTINUED | OUTPATIENT
Start: 2017-03-16 | End: 2017-03-20

## 2017-03-16 RX ORDER — MIDAZOLAM HYDROCHLORIDE 1 MG/ML
INJECTION INTRAMUSCULAR; INTRAVENOUS AS NEEDED
Status: DISCONTINUED | OUTPATIENT
Start: 2017-03-16 | End: 2017-03-16 | Stop reason: SURG

## 2017-03-16 RX ORDER — MORPHINE SULFATE 4 MG/ML
8 INJECTION, SOLUTION INTRAMUSCULAR; INTRAVENOUS EVERY 2 HOUR PRN
Status: DISCONTINUED | OUTPATIENT
Start: 2017-03-16 | End: 2017-03-20

## 2017-03-16 RX ORDER — BISACODYL 10 MG
10 SUPPOSITORY, RECTAL RECTAL
Status: DISCONTINUED | OUTPATIENT
Start: 2017-03-16 | End: 2017-03-20

## 2017-03-16 RX ORDER — SODIUM CHLORIDE 9 MG/ML
INJECTION, SOLUTION INTRAVENOUS CONTINUOUS
Status: DISCONTINUED | OUTPATIENT
Start: 2017-03-16 | End: 2017-03-20

## 2017-03-16 RX ORDER — POTASSIUM CHLORIDE 29.8 MG/ML
40 INJECTION INTRAVENOUS AS NEEDED
Status: DISCONTINUED | OUTPATIENT
Start: 2017-03-16 | End: 2017-03-20

## 2017-03-16 RX ORDER — DOBUTAMINE HYDROCHLORIDE 100 MG/100ML
INJECTION INTRAVENOUS CONTINUOUS PRN
Status: DISCONTINUED | OUTPATIENT
Start: 2017-03-16 | End: 2017-03-16 | Stop reason: SURG

## 2017-03-16 RX ORDER — FAMOTIDINE 20 MG/1
20 TABLET ORAL ONCE
Status: DISCONTINUED | OUTPATIENT
Start: 2017-03-16 | End: 2017-03-16

## 2017-03-16 RX ORDER — PAPAVERINE HYDROCHLORIDE 30 MG/ML
INJECTION INTRAMUSCULAR; INTRAVENOUS AS NEEDED
Status: DISCONTINUED | OUTPATIENT
Start: 2017-03-16 | End: 2017-03-16 | Stop reason: HOSPADM

## 2017-03-16 RX ORDER — MORPHINE SULFATE 2 MG/ML
2 INJECTION, SOLUTION INTRAMUSCULAR; INTRAVENOUS
Status: DISCONTINUED | OUTPATIENT
Start: 2017-03-16 | End: 2017-03-16

## 2017-03-16 RX ORDER — HYDROCODONE BITARTRATE AND ACETAMINOPHEN 5; 325 MG/1; MG/1
2 TABLET ORAL EVERY 4 HOURS PRN
Status: DISCONTINUED | OUTPATIENT
Start: 2017-03-16 | End: 2017-03-18

## 2017-03-16 RX ORDER — PHENYLEPHRINE HCL 10 MG/ML
VIAL (ML) INJECTION AS NEEDED
Status: DISCONTINUED | OUTPATIENT
Start: 2017-03-16 | End: 2017-03-16 | Stop reason: SURG

## 2017-03-16 RX ORDER — FAMOTIDINE 20 MG/1
20 TABLET ORAL ONCE
Status: COMPLETED | OUTPATIENT
Start: 2017-03-16 | End: 2017-03-16

## 2017-03-16 RX ORDER — MORPHINE SULFATE 2 MG/ML
2 INJECTION, SOLUTION INTRAMUSCULAR; INTRAVENOUS EVERY 2 HOUR PRN
Status: DISCONTINUED | OUTPATIENT
Start: 2017-03-16 | End: 2017-03-20

## 2017-03-16 RX ORDER — SODIUM CHLORIDE 9 MG/ML
INJECTION, SOLUTION INTRAVENOUS CONTINUOUS PRN
Status: DISCONTINUED | OUTPATIENT
Start: 2017-03-16 | End: 2017-03-16 | Stop reason: SURG

## 2017-03-16 RX ORDER — SODIUM CHLORIDE 9 MG/ML
INJECTION, SOLUTION INTRAVENOUS
Status: COMPLETED
Start: 2017-03-16 | End: 2017-03-16

## 2017-03-16 RX ORDER — ASCORBIC ACID 500 MG
500 TABLET ORAL 3 TIMES DAILY
Status: DISCONTINUED | OUTPATIENT
Start: 2017-03-17 | End: 2017-03-20

## 2017-03-16 RX ORDER — ENOXAPARIN SODIUM 100 MG/ML
40 INJECTION SUBCUTANEOUS DAILY
Status: DISCONTINUED | OUTPATIENT
Start: 2017-03-17 | End: 2017-03-20

## 2017-03-16 RX ORDER — FAMOTIDINE 10 MG/ML
INJECTION, SOLUTION INTRAVENOUS
Status: COMPLETED
Start: 2017-03-16 | End: 2017-03-16

## 2017-03-16 RX ORDER — VECURONIUM BROMIDE 1 MG/ML
INJECTION, POWDER, LYOPHILIZED, FOR SOLUTION INTRAVENOUS AS NEEDED
Status: DISCONTINUED | OUTPATIENT
Start: 2017-03-16 | End: 2017-03-16 | Stop reason: SURG

## 2017-03-16 RX ORDER — NEOSTIGMINE METHYLSULFATE 0.5 MG/ML
3 INJECTION INTRAVENOUS ONCE
Status: COMPLETED | OUTPATIENT
Start: 2017-03-16 | End: 2017-03-16

## 2017-03-16 RX ORDER — METOCLOPRAMIDE 10 MG/1
10 TABLET ORAL ONCE
Status: DISCONTINUED | OUTPATIENT
Start: 2017-03-16 | End: 2017-03-16

## 2017-03-16 RX ORDER — MIDAZOLAM HYDROCHLORIDE 1 MG/ML
2 INJECTION INTRAMUSCULAR; INTRAVENOUS EVERY 5 MIN PRN
Status: DISCONTINUED | OUTPATIENT
Start: 2017-03-16 | End: 2017-03-17

## 2017-03-16 RX ORDER — POTASSIUM CHLORIDE 14.9 MG/ML
20 INJECTION INTRAVENOUS AS NEEDED
Status: DISCONTINUED | OUTPATIENT
Start: 2017-03-16 | End: 2017-03-20

## 2017-03-16 RX ADMIN — PROTAMINE SULFATE 100 MG: 10 INJECTION, SOLUTION INTRAVENOUS at 14:20:00

## 2017-03-16 RX ADMIN — PHENYLEPHRINE HCL 100 MCG: 10 MG/ML VIAL (ML) INJECTION at 11:40:00

## 2017-03-16 RX ADMIN — Medication 50 ML: at 13:57:00

## 2017-03-16 RX ADMIN — EPHEDRINE SULFATE 5 MG: 50 INJECTION, SOLUTION INTRAVENOUS at 10:05:00

## 2017-03-16 RX ADMIN — HEPARIN SODIUM 3000 UNITS: 1000 INJECTION, SOLUTION INTRAVENOUS; SUBCUTANEOUS at 10:55:00

## 2017-03-16 RX ADMIN — PHENYLEPHRINE HCL 100 MCG: 10 MG/ML VIAL (ML) INJECTION at 11:30:00

## 2017-03-16 RX ADMIN — VECURONIUM BROMIDE 2 MG: 1 INJECTION, POWDER, LYOPHILIZED, FOR SOLUTION INTRAVENOUS at 11:50:00

## 2017-03-16 RX ADMIN — HEPARIN SODIUM 31000 UNITS: 1000 INJECTION, SOLUTION INTRAVENOUS; SUBCUTANEOUS at 11:35:00

## 2017-03-16 RX ADMIN — PROTAMINE SULFATE 100 MG: 10 INJECTION, SOLUTION INTRAVENOUS at 13:45:00

## 2017-03-16 RX ADMIN — MIDAZOLAM HYDROCHLORIDE 2 MG: 1 INJECTION INTRAMUSCULAR; INTRAVENOUS at 09:36:00

## 2017-03-16 RX ADMIN — PHENYLEPHRINE HCL 100 MCG: 10 MG/ML VIAL (ML) INJECTION at 10:05:00

## 2017-03-16 RX ADMIN — EPHEDRINE SULFATE 10 MG: 50 INJECTION, SOLUTION INTRAVENOUS at 11:40:00

## 2017-03-16 RX ADMIN — DOBUTAMINE HYDROCHLORIDE 2.5 MCG/KG/MIN: 100 INJECTION INTRAVENOUS at 13:00:00

## 2017-03-16 RX ADMIN — SODIUM CHLORIDE: 9 INJECTION, SOLUTION INTRAVENOUS at 09:34:00

## 2017-03-16 RX ADMIN — PROTAMINE SULFATE 450 MG: 10 INJECTION, SOLUTION INTRAVENOUS at 13:20:00

## 2017-03-16 RX ADMIN — SODIUM CHLORIDE: 9 INJECTION, SOLUTION INTRAVENOUS at 14:52:00

## 2017-03-16 RX ADMIN — MIDAZOLAM HYDROCHLORIDE 2 MG: 1 INJECTION INTRAMUSCULAR; INTRAVENOUS at 12:40:00

## 2017-03-16 RX ADMIN — VECURONIUM BROMIDE 3 MG: 1 INJECTION, POWDER, LYOPHILIZED, FOR SOLUTION INTRAVENOUS at 11:00:00

## 2017-03-16 RX ADMIN — SODIUM CHLORIDE: 9 INJECTION, SOLUTION INTRAVENOUS at 11:50:00

## 2017-03-16 RX ADMIN — FUROSEMIDE 10 MG: 10 INJECTION INTRAMUSCULAR; INTRAVENOUS at 12:41:00

## 2017-03-16 RX ADMIN — VECURONIUM BROMIDE 7 MG: 1 INJECTION, POWDER, LYOPHILIZED, FOR SOLUTION INTRAVENOUS at 10:02:00

## 2017-03-16 NOTE — PROGRESS NOTES
Misc. Note  Holding pre-op metoprolol due to SB heart rate 50s.  Will initiate BB post-op with parameters when to hold    Coty Canseco NP  3/16/2017

## 2017-03-16 NOTE — PROGRESS NOTES
[facility] AND [facility]  Progress Note    [address] [facility] Patient Status:  Inpatient    10/9/1960 MRN [id number]   Location [facility] 2W/SW Attending Tara Stevenson MD   Hosp Day # 3 PCP No primary care provider on file. Assessment:    1. tablet Oral Daily   • [START ON 3/17/2017] Vitamin C  500 mg Oral TID   • mupirocin  1 Application Nasal BID   • Clopidogrel Bisulfate  75 mg Oral Daily   • ceFAZolin  2 g Intravenous Q8H   • insulin (NOVOLIN R) bolus from bag  0.1 Units/kg Intravenous Onc

## 2017-03-16 NOTE — CONSULTS
[facility] [place] [facility]    Report of Consultation    [address] [place] Patient Status:  Inpatient    10/9/1960 MRN [id number]   Location [facility] 2W/SW Attending Liliana Zayas MD   Hosp Day # 3 PCP No primary care provider on file. NaCl infusion  Intravenous Continuous   temazepam (RESTORIL) cap 15 mg 15 mg Oral Nightly PRN   DOBUTamine in D5W (DOBUTREX) 250 mg/250 ml infusion 2.5-10 mcg/kg/min (Dosing Weight) Intravenous Continuous PRN   nitroGLYCERIN infusion 50mg in D5W 250ml 5-30 injection 8 mg 8 mg Intravenous Q2H PRN   Clopidogrel Bisulfate (PLAVIX) tab 75 mg 75 mg Oral Daily   CeFAZolin Sodium (ANCEF/KEFZOL) IVPB premix 2 g 2 g Intravenous Q8H   INSULIN STANDARD BOLUS FROM BAG 7.8 Units infusion 0.1 Units/kg Intravenous Once   I no clubbing, cyanosis, edema  Neurologic: Moves extremities  Skin: warm, dry    Results:   Laboratory Data    Lab Results  Component Value Date   WBC 20.7* 03/16/2017   HGB 11.1* 03/16/2017   HCT 32.5* 03/16/2017   * 03/16/2017   CREATSERUM 0.95 03/ ABDOMINAL AORTA COMPLETE (CPT=76770)  COMPARISON: None. INDICATIONS: Pre surgery for open heart. FINDINGS:  AORTA:  mild atherosclerosis with maximum diameter of 2.1 x 1.9 cm cm. Rosamaria Penfield ILIACS: Proximal iliac arteries appear normal.  Right measures 1.1 cm. Us Arterial Duplex Ltd Right Em (cpt=93926)    2/20/2017  PROCEDURE: LIMITED RIGHT LOWER EXTREMITY ARTERIAL DUPLEX DOPPLER  INDICATIONS: 58-year-old man with right groin pain status post right common femoral catheterization. COMPARISON: None.   TECHNIQ

## 2017-03-16 NOTE — OPERATIVE REPORT
[facility] 2W/SW  Operative Note     Ashley Medellin Location: OR   [facility] [id number] MRN [id number]   Admission Date 3/13/2017 Operation Date 3/16/2017   Attending Physician Joceline Johnson MD Operating Physician MD Lisa Estevez cannulation sutures were placed. The patient was heparinized with 4 mg/kg heparin ACT was monitored and kept in the appropriate range. The patient was cannulated started on cardiopulmonary bypass and cooled to 33 degrees.   The aorta was crossclamped, ant sternum was approximated and soft tissues were irrigated with antibiotic solution and closed in layers with absorbable sutures. Patient was monitored in the operating room to pass bleeding parameters before transferring to the surgical heart unit.   All co

## 2017-03-16 NOTE — RESPIRATORY THERAPY NOTE
RT mechanical ventilation progress note    Mechanical ventilation:received pt from OR. B/S clear bilaterally. Pt still sedated from prodedure.  Settings confirmed with Dr Sissy Gleason

## 2017-03-16 NOTE — ANESTHESIA PROCEDURE NOTES
Arterial Line  Performed by: Eleanor Yousif  Authorized by: Eleanor Yousif    Procedure Start:  3/16/2017 9:38 AM  Procedure End:  3/16/2017 10:43 AM  Site Identification: surface landmarks    Patient Location:  OR  Indication: continuous blood pressure mo pressure tracing changes    PA Catheter Depth (cm):  50  Events: patient tolerated procedure well with no complications     Wedge not attempted.   Procedure Performed: ASHELY      Start Time:  3/16/2017 10:10 AM       End Time:      Preanesthesia Checklist:  P present.               Other Findings  Pericardium:  normal      Anesthesia Information  Performed Personally  Anesthesiologist:  Tamiko Barrera

## 2017-03-16 NOTE — ANESTHESIA POSTPROCEDURE EVALUATION
Patient: [address] [facility]    Procedure Summary     Date Anesthesia Start Anesthesia Stop Room / Location    03/16/17 0933 1453 [facility] MAIN OR 18 / 300 [facility] MAIN OR       Procedure Diagnosis Surgeon Responsible Provider    HEART CORONARY ARTERY BYPASS GRAFT (N/A

## 2017-03-17 ENCOUNTER — APPOINTMENT (OUTPATIENT)
Dept: GENERAL RADIOLOGY | Facility: HOSPITAL | Age: 57
DRG: 236 | End: 2017-03-17
Attending: NURSE PRACTITIONER
Payer: COMMERCIAL

## 2017-03-17 ENCOUNTER — APPOINTMENT (OUTPATIENT)
Dept: CT IMAGING | Facility: HOSPITAL | Age: 57
DRG: 236 | End: 2017-03-17
Attending: Other
Payer: COMMERCIAL

## 2017-03-17 LAB
ANION GAP SERPL CALC-SCNC: 8 MMOL/L (ref 0–18)
BASE EXCESS BLD CALC-SCNC: -0.9 MMOL/L (ref ?–2)
BASOPHILS # BLD: 0 K/UL (ref 0–0.2)
BASOPHILS NFR BLD: 0 %
BUN SERPL-MCNC: 14 MG/DL (ref 8–20)
BUN/CREAT SERPL: 14.7 (ref 10–20)
CA-I BLD-SCNC: 1.19 MMOL/L (ref 0.95–1.32)
CALCIUM SERPL-MCNC: 8.6 MG/DL (ref 8.5–10.5)
CHLORIDE SERPL-SCNC: 110 MMOL/L (ref 95–110)
CO2 SERPL-SCNC: 24 MMOL/L (ref 22–32)
COHGB MFR BLD: 1.6 % (ref 0–1.5)
CREAT SERPL-MCNC: 0.95 MG/DL (ref 0.5–1.5)
EOSINOPHIL # BLD: 0 K/UL (ref 0–0.7)
EOSINOPHIL NFR BLD: 0 %
ERYTHROCYTE [DISTWIDTH] IN BLOOD BY AUTOMATED COUNT: 13.2 % (ref 11–15)
GLUCOSE BLDC GLUCOMTR-MCNC: 107 MG/DL (ref 70–99)
GLUCOSE BLDC GLUCOMTR-MCNC: 111 MG/DL (ref 70–99)
GLUCOSE BLDC GLUCOMTR-MCNC: 111 MG/DL (ref 70–99)
GLUCOSE BLDC GLUCOMTR-MCNC: 112 MG/DL (ref 70–99)
GLUCOSE BLDC GLUCOMTR-MCNC: 113 MG/DL (ref 70–99)
GLUCOSE BLDC GLUCOMTR-MCNC: 113 MG/DL (ref 70–99)
GLUCOSE BLDC GLUCOMTR-MCNC: 116 MG/DL (ref 70–99)
GLUCOSE BLDC GLUCOMTR-MCNC: 122 MG/DL (ref 70–99)
GLUCOSE BLDC GLUCOMTR-MCNC: 125 MG/DL (ref 70–99)
GLUCOSE BLDC GLUCOMTR-MCNC: 128 MG/DL (ref 70–99)
GLUCOSE BLDC GLUCOMTR-MCNC: 130 MG/DL (ref 70–99)
GLUCOSE BLDC GLUCOMTR-MCNC: 132 MG/DL (ref 70–99)
GLUCOSE BLDC GLUCOMTR-MCNC: 132 MG/DL (ref 70–99)
GLUCOSE BLDC GLUCOMTR-MCNC: 133 MG/DL (ref 70–99)
GLUCOSE BLDC GLUCOMTR-MCNC: 135 MG/DL (ref 70–99)
GLUCOSE BLDC GLUCOMTR-MCNC: 137 MG/DL (ref 70–99)
GLUCOSE BLDC GLUCOMTR-MCNC: 142 MG/DL (ref 70–99)
GLUCOSE SERPL-MCNC: 133 MG/DL (ref 70–99)
HCO3 BLDA-SCNC: 22.7 MEQ/L (ref 21–27)
HCT VFR BLD AUTO: 25.9 % (ref 41–52)
HGB BLD-MCNC: 8.9 G/DL (ref 13.5–17.5)
HGB BLD-MCNC: 9 G/DL (ref 13.5–17.5)
LYMPHOCYTES # BLD: 0.4 K/UL (ref 1–4)
LYMPHOCYTES NFR BLD: 3 %
MAGNESIUM SERPL-MCNC: 2 MG/DL (ref 1.8–2.5)
MCH RBC QN AUTO: 29.7 PG (ref 27–32)
MCHC RBC AUTO-ENTMCNC: 34.2 G/DL (ref 32–37)
MCV RBC AUTO: 86.8 FL (ref 80–100)
METHGB MFR BLD: 0.2 % SAT (ref 0.4–1.5)
MONOCYTES # BLD: 0.8 K/UL (ref 0–1)
MONOCYTES NFR BLD: 5 %
NEUTROPHILS # BLD AUTO: 14.3 K/UL (ref 1.8–7.7)
NEUTROPHILS NFR BLD: 92 %
O2 CT BLD-SCNC: 12.6 VOL% (ref 15–23)
O2/TOTAL GAS SETTING VFR VENT: 40 %
OSMOLALITY UR CALC.SUM OF ELEC: 296 MOSM/KG (ref 275–295)
PCO2 BLDA: 35 MM HG (ref 35–45)
PEEP SETTING VENT: 5 CM H2O
PH BLDA: 7.42 [PH] (ref 7.35–7.45)
PLATELET # BLD AUTO: 138 K/UL (ref 140–400)
PMV BLD AUTO: 7.8 FL (ref 7.4–10.3)
PO2 BLDA: 156 MM HG (ref 80–100)
PO2 SATN ADJ TO 0.5 BLD: 25 MMHG (ref 24–28)
POTASSIUM (BLOOD GAS): 4 MMOL/L (ref 3.3–5.1)
POTASSIUM SERPL-SCNC: 4.2 MMOL/L (ref 3.3–5.1)
PRESSURE SUPPORT SETTING VENT: 5 CM H2O
PUNCTURE CHARGE: NO
RBC # BLD AUTO: 2.98 M/UL (ref 4.5–5.9)
SAO2 % BLDA: >98.5 % (ref 94–100)
SODIUM (BLOOD GAS): 145 MMOL/L (ref 135–145)
SODIUM SERPL-SCNC: 142 MMOL/L (ref 136–144)
WBC # BLD AUTO: 15.5 K/UL (ref 4–11)

## 2017-03-17 PROCEDURE — 70450 CT HEAD/BRAIN W/O DYE: CPT

## 2017-03-17 PROCEDURE — 71010 XR CHEST AP PORTABLE  (CPT=71010): CPT

## 2017-03-17 PROCEDURE — 99232 SBSQ HOSP IP/OBS MODERATE 35: CPT | Performed by: INTERNAL MEDICINE

## 2017-03-17 PROCEDURE — 99254 IP/OBS CNSLTJ NEW/EST MOD 60: CPT | Performed by: OTHER

## 2017-03-17 RX ORDER — MELATONIN
325
Status: DISCONTINUED | OUTPATIENT
Start: 2017-03-17 | End: 2017-03-20

## 2017-03-17 RX ORDER — 0.9 % SODIUM CHLORIDE 0.9 %
VIAL (ML) INJECTION
Status: COMPLETED
Start: 2017-03-17 | End: 2017-03-17

## 2017-03-17 RX ORDER — ALPRAZOLAM 0.25 MG/1
0.25 TABLET ORAL 2 TIMES DAILY PRN
Status: DISCONTINUED | OUTPATIENT
Start: 2017-03-17 | End: 2017-03-18

## 2017-03-17 NOTE — PAYOR COMM NOTE
Buster Ye #[id number]    Admission Info: Inpatient (Adm: 03/13/17)   Hospital Account: [de-identified]    Description: 60year old M   Primary Service: Surgical   [facility]         History and Physical      H&P by Rosemarie De Paz MD at 3 Alcohol Use: Yes           7.2 oz/week       12 Cans of beer per week      Allergies/Medications:    Allergies: No Known Allergies    Prescriptions prior to admission:  lisinopril 2.5 MG Oral Tab  Take 1 tablet (2.5 mg total) by mouth daily.    cetirizine 80 x 3  Brilinta held  Will start on integrellin gtt  Continue statin    DVT PPx: SCDs  Full code    Marina Coreas.  Delfino Croft MD  3/13/2017                Signed by Joyce Suarez MD on 3/13/2017  3:14 PM

## 2017-03-17 NOTE — PAYOR COMM NOTE
[facility] #[id number]    Admission Info: Inpatient (Adm: 03/13/17)   Hospital Account: [de-identified]    Description: 60year old M   Primary Service: Surgical   Unit Info: [address]             Admission Orders        ADMIT TO INPATIENT [[id number]]

## 2017-03-17 NOTE — PROGRESS NOTES
[facility]  Progress Note    [address] [facility] Patient Status:  Inpatient    10/9/1960 MRN [id number]   Location [facility] 2W/SW Attending Theresa Alonzo MD   Hosp Day # 4 PCP No primary care provider on file. Assessment:    1. mg Oral TID   • mupirocin  1 Application Nasal BID   • Clopidogrel Bisulfate  75 mg Oral Daily   • ceFAZolin  2 g Intravenous Q8H   • insulin (NOVOLIN R) bolus from bag  0.1 Units/kg Intravenous Once   • aspirin  81 mg Oral Daily   • metoprolol Tartrate  1

## 2017-03-17 NOTE — PAYOR COMM NOTE
Progress Notes by RADHA Love at 3/15/2017  3:15 PM      Author: RADHA Love Service: (none) Author Type: RADHA     Filed: 3/15/2017  3:17 PM Note Time: 3/15/2017  3:15 PM Status: Signed     : RADHA Love (RADHA)       Misc. Full range of motion of all extremities.  No swelling noted. Integument: No lesions. No erythema.   Psychiatric: Appropriate mood and affect.        Current Inpatient Medications:      Current facility-administered medications:    •  [START ON 3/16/2017] C in aorta    P2Y12 inhibition level pending      DVT PPx: SCDs  Full code      Results:        Recent Labs    Lab  03/14/17   0614   03/15/17   0406    RBC   4.36*   4.01*    HGB   13.0*   11.8*    HCT   38.6*   35.3*    MCV   88.5   88.0    MCH   29.9   29 Patient Status:  Kellie Velazco [birth year]  MRN  [id number]    [facility] 2W/SW  Attending  Estrella Connors MD    Hosp Day #  3  PCP  No primary care provider on file.      Assessment:    1. CAD.  S/P CABG x 3  03/16/17      EKG with •  [START ON 3/17/2017] multivitamin   1 tablet  Oral  Daily    •  [START ON 3/17/2017] Vitamin C   500 mg  Oral  TID    •  mupirocin   1 Application  Nasal  BID    •  Clopidogrel Bisulfate   75 mg  Oral  Daily    •  ceFAZolin   2 g  Intravenous  Q8H  Pulse 59  Temp(Src) 97.6 °F (36.4 °C) (Temporal)  Resp 17  Wt 174 lb 8 oz (79.153 kg)  SpO2 94%    Temp (24hrs), Av °F (36.7 °C), Min:97.6 °F (36.4 °C), Max:98.4 °F (36.9 °C)      Intake/Output:    Intake/Output Summary (Last 24 hours) at 17 0829 Hold] docusate sodium   100 mg  Oral  BID    •  [MAR Hold] Atorvastatin Calcium   40 mg  Oral  Nightly    •  cetirizine   10 mg  Oral  Daily        •  sodium chloride       •  DOBUTamine in D5W  Stopped (03/17/17 0630)    •  Nitroglycerin in D5W       •  n

## 2017-03-17 NOTE — PLAN OF CARE
Problem: SAFETY ADULT - FALL  Goal: Free from fall injury  INTERVENTIONS:  - Assess pt frequently for physical needs  - Identify cognitive and physical deficits and behaviors that affect risk of falls.   - [place] fall precautions as indicated by assessme mentation and behavior  - Position to facilitate oxygenation and minimize respiratory effort  - Oxygen supplementation based on oxygen saturation or ABGs  - Provide Smoking Cessation handout, if applicable  - Encourage broncho-pulmonary hygiene including c indicated or ordered  - Monitor response to interventions for patient’s volume status, including labs, urine output, blood pressure (other measures as available)  - Encourage oral intake as appropriate  - Instruct patient on fluid and nutrition restriction

## 2017-03-17 NOTE — PAYOR COMM NOTE
Progress Notes by Kwan Diaz NP at 3/17/2017  9:29 AM      Author: Kwan Diaz NP Service: (none) Author Type: Nurse Practitioner     Filed: 3/17/2017  9:39 AM Note Time: 3/17/2017  9:29 AM Status: Signed     : Kwan Diaz NP (Nurs mg  Oral  Daily        Continuous Infusions:   •  sodium chloride       •  DOBUTamine in D5W  Stopped (03/17/17 0630)    •  Nitroglycerin in D5W       •  norepinephrine  3 mcg/min (03/17/17 0900)    •  nitroprusside (NIPRIDE) 50 mg in D5W infusion       •  03/17/2017    CO2  24  03/17/2017    GLU  133*  03/17/2017    CA  8.6  03/17/2017    ALB  3.8  03/14/2017    ALKPHO  71  03/14/2017    BILT  0.5  03/14/2017    TP  6.9  03/14/2017    AST  24  03/14/2017    ALT  37  03/14/2017    PTT  37.8*  03/16/2017    I Patient Status:  Rachid Yung [birth year]  MRN  [id number]    [facility] 2W/SW  Attending  Nerissa Skiff, MD    Hosp Day #  4  PCP  No primary care provider on file.          Subjective:    Patient seen and examined.  Resting in be mcg/kg/min (Dosing Weight)  Intravenous  Continuous PRN    Milrinone in Dextrose 20 MG/100ML premix infusion  0.375 mcg/kg/min (Dosing Weight)  Intravenous  PRN    magnesium hydroxide (MILK OF MAGNESIA) 400 MG/5ML suspension 30 mL  30 mL  Oral  Daily PRN  (LOPRESSOR) tab 12.5 mg  12.5 mg  Oral  2x Daily(Beta Blocker)    Enoxaparin Sodium (LOVENOX) 40 MG/0.4ML injection 40 mg  40 mg  Subcutaneous  Daily    famoTIDine (PEPCID) injection 20 mg  20 mg  Intravenous  BID    0.9%  NaCl infusion    Intravenous  Con [facility], XR CHEST AP PORTABLE (CPT=71010), 2/13/2017, 23:54.  INDICATIONS:  Pre-op open heart surgery scheduled on 3/17/17.  Hx of heart attack and stent palcement x1 month ago.  TECHNIQUE:    Two views.   FINDINGS: hemorrhage, mass, acute infarction, or significant atrophy.  BRAINSTEM:    No edema, hemorrhage, mass, acute infarction, or significant atrophy.  CALVARIUM:    There is no apparent depressed fracture, mass, or other significant visible lesion.  SINUSES:    open heart.  TECHNIQUE:         Single view.   FINDINGS:        CARDIAC/VASC:         The cardiac silhouette is not enlarged.  There are clips-sutures over the cardiac silhouette and mediastinum, compatible with prior CABG.   Mild central vascular congestio arteriovenous fistula.  Dictated by (CST): Stanislav Aguilar MD on 2/20/2017 at 16:25     Approved by (CST): Stanislav Aguilar MD on 2/20/2017 at [address]  [facility]     2/20/2017  CONCLUSION:         1.  No evidence of pseudoaneurysm, hematoma, or arteriovenous fis chair  -Incentive spirometry  -Chest tube management per CT surgery  -Aspirin, Plavix, beta-blocker  -DVT prophylaxis: Lovebailee Mcintyre, DO  Pulmonary [facility]

## 2017-03-17 NOTE — CONSULTS
[facility]    Report of Consultation    [address] [place] Patient Status:  Inpatient    10/9/1960 MRN [id number]   Location [facility] 2W/SW Attending Liliana Zayas MD   Hosp Day # 4 PCP No primary care provider on file. ALPRAZolam (XANAX) tab 0.25 mg 0.25 mg Oral BID PRN   insulin aspart (NOVOLOG) 100 UNIT/ML flexpen 1-5 Units 1-5 Units Subcutaneous TID CC   Chlorhexidine Gluconate (PERIDEX) 0.12 % solution 15 mL 15 mL [email or web address]   Normal Saline Flush 0. injection 2 mg 2 mg Intravenous Q2H PRN   Or      morphINE sulfate (PF) 4 MG/ML injection 4 mg 4 mg Intravenous Q2H PRN   Or      morphINE sulfate (PF) 4 MG/ML injection 8 mg 8 mg Intravenous Q2H PRN   Clopidogrel Bisulfate (PLAVIX) tab 75 mg 75 mg Oral Da vomiting, constipation, diarrhea; no heartburn  GENITAL/: no dysuria, urgency or frequency; no nocturia  MUSCULOSKELETAL: no joint complaints upper or lower extremities  PSYCHE:no depression or anxiety  NEURO: Occasional headaches.     Physical Exam:   Bl 03/17/2017   * 03/17/2017   CA 8.6 03/17/2017   ALB 3.8 03/14/2017   ALKPHO 71 03/14/2017   TP 6.9 03/14/2017   AST 24 03/14/2017   ALT 37 03/14/2017   PTT 37.8* 03/16/2017   INR 1.6* 03/16/2017   PTP 19.0* 03/16/2017   TSH 2.45 03/16/2017   MG 2.0

## 2017-03-17 NOTE — PAYOR COMM NOTE
Progress Notes by Von Humphries MD at 3/14/2017 10:10 AM      Author: Von Humphries MD Service: (none) Author Type: Physician     Filed: 3/14/2017 10:15 AM Note Time: 3/14/2017 10:10 AM Status: Addendum     : Von Humphries MD (Physi PRN  •  magnesium hydroxide (MILK OF MAGNESIA) 400 MG/5ML suspension 30 mL, 30 mL, Oral, Daily PRN  •  bisacodyl (DULCOLAX) rectal suppository 10 mg, 10 mg, Rectal, Daily PRN  •  ondansetron HCl (ZOFRAN) injection 4 mg, 4 mg, Intravenous, Q6H PRN  •  Atorv disease. Heart size has diminished        Us Carotid Doppler Bilat - Diag Img (cpt=93880)    3/13/2017  CONCLUSION:         1.  No hemodynamically significant stenosis in the left or right carotids. 2.  Normal antegrade flow within the vertebral arteries.   without wheezes, rales, rhonchi or dullness.  Normal excursions and effort. Abdomen: Soft, non-tender. BS-present. Extremities: Without clubbing, cyanosis or edema.  Peripheral pulses are 2+. Neurologic: Alert and oriented, normal affect.   Skin: Warm OINT 1 Application  1 Application  Nasal  Now then every 0500    acetaminophen (TYLENOL) 650 MG rectal suppository 650 mg  650 mg  Rectal  Q6H PRN          Assessment:  Coronary artery disease, severe  In-stent restenosis left thrombosis within 1 month of

## 2017-03-17 NOTE — RESPIRATORY THERAPY NOTE
Patient tolerated CPAP trial for 30 minutes, ABG drawn with results: pH - 7.42, pCO2 35, pO2 156, HCO3 22.7, BE -0.9    Patient extubated, placed on 3L nasal cannula and instructed in cough and use of yankauer suction.

## 2017-03-17 NOTE — PAYOR COMM NOTE
Operative Report by Loulou Messer MD at 3/16/2017  3:09 PM      Author: Loulou Messer MD Service: (none) Author Type: Physician     Filed: 3/16/2017  3:13 PM Note Time: 3/16/2017  3:09 PM Status: Signed     : Loulou Messer MD (Physi and tied and prepared for use.  The vein size was good and quality was good. Median sternotomy was performed and the left internal mammary was taken down from the chest wall.  It was of adequate size and quality with good flow.   The pericardium was opened placed with the tip of the right tube in the right pleural space and a right angle tube in the left pleural space. The pleuropericardial tissues were approximated over the heart and aorta.   The sternum was then closed using wires.  After obtaining meticul

## 2017-03-17 NOTE — CM/SW NOTE
JODIE met with the patient, his sister and his sister-in-law at bedside to discuss his discharge plan. JODIE spoke with [facility]  [place] with Loco Farmer.   Explained that a re

## 2017-03-17 NOTE — PROGRESS NOTES
[facility]     Progress Note        [address] [facility] Patient Status:  Inpatient    10/9/1960 MRN [id number]   Location [facility] 2W/SW Attending Sidney Lopez MD   Hosp Day # 4 PCP No primary care provider on file. Weight) Intravenous Continuous PRN   Milrinone in Dextrose 20 MG/100ML premix infusion 0.375 mcg/kg/min (Dosing Weight) Intravenous PRN   magnesium hydroxide (MILK OF MAGNESIA) 400 MG/5ML suspension 30 mL 30 mL Oral Daily PRN   bisacodyl (DULCOLAX) rectal Daily   famoTIDine (PEPCID) injection 20 mg 20 mg Intravenous BID   0.9%  NaCl infusion  Intravenous Continuous   [MAR Hold] docusate sodium (COLACE) cap 100 mg 100 mg Oral BID   [MAR Hold] PEG 3350 (MIRALAX) powder packet 17 g 17 g Oral Daily PRN   [MAR H Unremarkable pulmonary vasculature. MEDIAST/ROYCE: Atherosclerotic aorta with no visible aneurysm. Coronary artery atherosclerosis disease and/or coronary stents visualized. LUNGS/PLEURA: No acute cardiopulmonary disease.  BONES: Mild scoliosis with mild d grossly unremarkable. OTHER: Extensive dental amalgam is present on the  view.    Dictated by (CST): Lew Marcos MD on 3/17/2017 at 12:52     Approved by (CST): Lew Marcos MD on 3/17/2017 at 12:56          3/17/2017  CONCLUSION:  No acute in OTHER: Right jugular Neligh-Edvin catheter is present with tip at the level of the base of the right pulmonary artery. Endotracheal tube is present over the trachea at the level of the aortic knob.   Nasogastric tube extends below the diaphragm with tip in th with ECG of 03/16/2017 15:09:51 minor repolarization changes have occurred.  Electronically signed on 03/17/2017 at 12:44 by Will Velazquez MD    Ekg 12-lead    3/16/2017  ECG Report  Interpretation  -------------------------- Sinus Rhythm Low voltage in limb

## 2017-03-17 NOTE — PROGRESS NOTES
Misc. Note    Jayy Viera NP  3/17/2017  [facility] HOSP - [facility]    Progress Note    Todd Hankins Patient Status:  Inpatient    10/9/1960 MRN [id number]   Location [facility] 2W/SW Attending Eris Stewart MD   Hosp Day # 4 PCP (03/16/17 7516)       General appearance: alert and cooperative  Neurologic: Alert and oriented X 3, tongue midline; smile symmetrical. No double vision; hand grasps equal and strong; leg strength equal and strong;   Psychiatric: calm  Sternum Incision brenna position of lines and tubes as above. No pneumothorax. Basal atelectasis.       Ekg 12-lead    3/17/2017  ECG Report  Interpretation  --------------------------     Ekg 12-lead    3/16/2017  ECG Report  Interpretation  -------------------------- Sinus Rhy

## 2017-03-17 NOTE — RESPIRATORY THERAPY NOTE
Patient weaning parameters on ventilator:    NIF: -27 cmH2O  VC: 1260 mL  Spontaneous RR: 23 bpm  Spontaneous Vt: 440 mL  Minute Ventilation: 8.8L  RSBI: 54    Patient initiated on CPAP mode with settings PEEP +5, Pressure Support of 5.  Will draw expanded

## 2017-03-17 NOTE — PAYOR COMM NOTE
Progress Notes by Steve iDxon MD at 3/15/2017  1:45 PM      Author: Steve Dixon MD Service: (none) Author Type: Physician     Filed: 3/15/2017  1:46 PM Note Time: 3/15/2017  1:45 PM Status: Signed     : Steve Dixon MD (Physician)       El 03/15/2017    HCT  35.3  03/15/2017    PLT  225  03/15/2017        Lab Results  Component  Value  Date    INR  1.0  03/14/2017        Lab Results  Component  Value  Date    NA  138  03/15/2017    K  4.3  03/15/2017    CL  106  03/15/2017    CO2  27  03/15/

## 2017-03-18 LAB
GLUCOSE BLDC GLUCOMTR-MCNC: 104 MG/DL (ref 70–99)
GLUCOSE BLDC GLUCOMTR-MCNC: 107 MG/DL (ref 70–99)
GLUCOSE BLDC GLUCOMTR-MCNC: 109 MG/DL (ref 70–99)
GLUCOSE BLDC GLUCOMTR-MCNC: 84 MG/DL (ref 70–99)
PLATELET # BLD AUTO: 119 K/UL (ref 140–400)

## 2017-03-18 PROCEDURE — 99233 SBSQ HOSP IP/OBS HIGH 50: CPT | Performed by: INTERNAL MEDICINE

## 2017-03-18 PROCEDURE — 99232 SBSQ HOSP IP/OBS MODERATE 35: CPT | Performed by: HOSPITALIST

## 2017-03-18 PROCEDURE — 99232 SBSQ HOSP IP/OBS MODERATE 35: CPT | Performed by: OTHER

## 2017-03-18 RX ORDER — ALPRAZOLAM 0.25 MG/1
0.25 TABLET ORAL 3 TIMES DAILY PRN
Status: DISCONTINUED | OUTPATIENT
Start: 2017-03-18 | End: 2017-03-20

## 2017-03-18 RX ORDER — 0.9 % SODIUM CHLORIDE 0.9 %
VIAL (ML) INJECTION
Status: COMPLETED
Start: 2017-03-18 | End: 2017-03-18

## 2017-03-18 RX ORDER — HYDROCODONE BITARTRATE AND ACETAMINOPHEN 7.5; 325 MG/1; MG/1
2 TABLET ORAL EVERY 4 HOURS PRN
Status: DISCONTINUED | OUTPATIENT
Start: 2017-03-18 | End: 2017-03-20

## 2017-03-18 RX ORDER — HYDROCODONE BITARTRATE AND ACETAMINOPHEN 7.5; 325 MG/1; MG/1
1 TABLET ORAL EVERY 4 HOURS PRN
Status: DISCONTINUED | OUTPATIENT
Start: 2017-03-18 | End: 2017-03-20

## 2017-03-18 NOTE — PROGRESS NOTES
[facility]  Assessment and plan     S/P Cabg X3 POD #2  Encourage Pulmonary toilet; IS and acapella  DVT prophylaxis; scds; lovenox subcut  Increase activity up in chair ; ambulate today  Chest tubes cordis and mooer removed today.   Will stop leve

## 2017-03-18 NOTE — PROGRESS NOTES
Neurology Inpatient Follow-up Note      HPI:     Patient is being seen in follow-up. His family member is at the bedside. Interval history reviewed. In brief, patient was initially admitted on 3/13/17 for CABG. He underwent surgery on 3/12/17.   Etheleen Schaumann head/neck    –My impression and recommendations were discussed with patient and family member at bedside in lay terms    Neurology service will continue to follow. Please page with any questions / concerns.     Cong Mistry MD  [facility]

## 2017-03-18 NOTE — HOME CARE LIAISON
MET WITH PTNT TO DISCUSS HOME HEALTH SERVICES AND COVERAGE CRITERIA. PTNT AGREEABLE TO [address] [place] [address]. PTNT GIVEN RESIDENTIAL BROCHURE AND CONTACT INFORMATION. Desirae RN/PT.   PTNT ADMITTED TO [address] [place] [address] 3/13/17 D/T CAD S/P

## 2017-03-18 NOTE — PLAN OF CARE
Problem: Patient Centered Care  Goal: Patient preferences are identified and integrated in the patient’s plan of care  Interventions:  - What would you like us to know as we care for you?   - Provide timely, complete, and accurate information to patient/fa ambulates with minimal assistance. Gait steady. No falls/injury noted at this time.  CPM    Problem: CARDIOVASCULAR - ADULT  Goal: Maintains optimal cardiac output and hemodynamic stability  INTERVENTIONS:  - Monitor vital signs, rhythm, and trends  - Monit

## 2017-03-18 NOTE — PLAN OF CARE
Problem: SAFETY ADULT - FALL  Goal: Free from fall injury  INTERVENTIONS:  - Assess pt frequently for physical needs  - Identify cognitive and physical deficits and behaviors that affect risk of falls.   - [place] fall precautions as indicated by assessme oxygen saturation or ABGs  - Provide Smoking Cessation handout, if applicable  - Encourage broncho-pulmonary hygiene including cough, deep breathe, Incentive Spirometry  - Assess the need for suctioning and perform as needed  - Assess and instruct to repor appropriate   Outcome: Progressing  Adequate urine output    Problem: SKIN/TISSUE INTEGRITY - ADULT  Goal: Incision(s), wounds(s) or drain site(s) healing without S/S of infection  INTERVENTIONS:  - Assess and document risk factors for pressure ulcer devel nutritional intake and initiate nutrition consult as needed  - Instruct patient on self management of diabetes   Outcome: Progressing  HS accucheck 129

## 2017-03-18 NOTE — PROGRESS NOTES
[facility]    Cardiology Progress Note    Nancy Ortiz Patient Status:  Inpatient    10/9/1960 MRN [id number]   Location [facility] 2W/SW Attending Javier Che MD   Hosp Day # 5 PCP No primary care provider on file. BUN 14 03/17/2017    03/17/2017   K 4.2 03/17/2017    03/17/2017   CO2 24 03/17/2017   * 03/17/2017   CA 8.6 03/17/2017   ALB 3.8 03/14/2017   ALKPHO 71 03/14/2017   BILT 0.5 03/14/2017   TP 6.9 03/14/2017   AST 24 03/14/2017   ALT 37

## 2017-03-18 NOTE — PROGRESS NOTES
Pulmonary/Critical Care Follow Up Note    HPI:   Yazmin Barrios is a 60year old male with No chief complaint on file. PCP No primary care provider on file.   Admission Attending Rock Santana MD    Hospital Day #5    \"terrible\"  Thinks CABG wa acetaminophen (TYLENOL) tab 650 mg, 650 mg, Oral, Q4H PRN **OR** [DISCONTINUED] HYDROcodone-acetaminophen (NORCO) 5-325 MG per tab 1 tablet, 1 tablet, Oral, Q4H PRN **OR** [DISCONTINUED] HYDROcodone-acetaminophen (NORCO) 5-325 MG per tab 2 tablet, 2 tablet pain   Asa/Plavix   IS    Leukocytosis  Post op  Better today  No fever  Plan follow    Visual changes  New prob  \"jumping eyes\"  Carlus Bile not clear  Plan follow      Acute blood loss anemia  11--> 9  Plan follow up tomorrow    Hypercholesteremia  Plan sta

## 2017-03-18 NOTE — PROGRESS NOTES
[facility] - [facility]  Hospitalist Progress  Note     Oanh Hankins Patient Status:  Inpatient      60year old [facility] [id number]   Location [address] Attending Linda Hernandez MD   Hosp Day # 5 PCP No primary care provider on file.      AS 138*  119*     Recent Labs   Lab  03/16/17   0559  03/16/17   1428  03/16/17   2250  03/17/17   0440   GLU  116*  115*   --   133*   BUN  13  11   --   14   CREATSERUM  0.85  0.95   --   0.95   GFRAA  >60  >60   --   >60   GFRNAA  >60  >60   --   >60   CA

## 2017-03-19 LAB
ANION GAP SERPL CALC-SCNC: 5 MMOL/L (ref 0–18)
BLOOD TYPE BARCODE: 5100
BUN SERPL-MCNC: 16 MG/DL (ref 8–20)
BUN/CREAT SERPL: 21.3 (ref 10–20)
CALCIUM SERPL-MCNC: 8.6 MG/DL (ref 8.5–10.5)
CHLORIDE SERPL-SCNC: 106 MMOL/L (ref 95–110)
CO2 SERPL-SCNC: 27 MMOL/L (ref 22–32)
CREAT SERPL-MCNC: 0.75 MG/DL (ref 0.5–1.5)
ERYTHROCYTE [DISTWIDTH] IN BLOOD BY AUTOMATED COUNT: 13.5 % (ref 11–15)
GLUCOSE BLDC GLUCOMTR-MCNC: 99 MG/DL (ref 70–99)
GLUCOSE SERPL-MCNC: 114 MG/DL (ref 70–99)
HCT VFR BLD AUTO: 22.3 % (ref 41–52)
HGB BLD-MCNC: 7.7 G/DL (ref 13.5–17.5)
MCH RBC QN AUTO: 30.2 PG (ref 27–32)
MCHC RBC AUTO-ENTMCNC: 34.5 G/DL (ref 32–37)
MCV RBC AUTO: 87.6 FL (ref 80–100)
OSMOLALITY UR CALC.SUM OF ELEC: 288 MOSM/KG (ref 275–295)
PLATELET # BLD AUTO: 126 K/UL (ref 140–400)
PMV BLD AUTO: 7.9 FL (ref 7.4–10.3)
POTASSIUM SERPL-SCNC: 3.9 MMOL/L (ref 3.3–5.1)
RBC # BLD AUTO: 2.54 M/UL (ref 4.5–5.9)
SODIUM SERPL-SCNC: 138 MMOL/L (ref 136–144)
WBC # BLD AUTO: 7.5 K/UL (ref 4–11)

## 2017-03-19 PROCEDURE — 99232 SBSQ HOSP IP/OBS MODERATE 35: CPT | Performed by: HOSPITALIST

## 2017-03-19 PROCEDURE — 99232 SBSQ HOSP IP/OBS MODERATE 35: CPT | Performed by: OTHER

## 2017-03-19 RX ORDER — FAMOTIDINE 20 MG/1
20 TABLET ORAL 2 TIMES DAILY
Status: DISCONTINUED | OUTPATIENT
Start: 2017-03-19 | End: 2017-03-20

## 2017-03-19 RX ORDER — FAMOTIDINE 20 MG/1
10 TABLET ORAL 2 TIMES DAILY
Status: DISCONTINUED | OUTPATIENT
Start: 2017-03-19 | End: 2017-03-19

## 2017-03-19 NOTE — PROGRESS NOTES
Neurology Inpatient Follow-up Note      HPI:     Patient is being seen in follow-up. Multiple family members at bedside.   Patient continues to have episodes, which he describes as \"lasting 1/10 of a second\" and not a visual phenomenon per se, but more a Harshil Rodrigez

## 2017-03-19 NOTE — PROGRESS NOTES
Tammy Raphael, RN Registered Nurse Cosign Needed  Progress Notes 3/18/2017 12:03 PM      Expand All Collapse All      [facility] Progress note  Assessment and plan      S/P Cabg X3 POD #3  Encourage Pulmonary toilet; IS and acapella  DVT prophylaxis; scds; sebas

## 2017-03-19 NOTE — PLAN OF CARE
CARDIOVASCULAR - ADULT    • Maintains optimal cardiac output and hemodynamic stability Progressing    • Absence of cardiac arrhythmias or at baseline Progressing    S/P CABG X3, PACER WIRES CAPPED, VSS    METABOLIC/FLUID AND ELECTROLYTES - ADULT    • Gluco

## 2017-03-19 NOTE — PROGRESS NOTES
[place] [facility] - [facility]  Hospitalist Progress  Note     Saniya Hankins Patient Status:  Inpatient      60year old CSN [id number]   Location 233-A Attending Lindy Cuba MD   Hosp Day # 6 PCP No primary care provider on file.      AS 0650   GLU  115*   --   133*  114*   BUN  11   --   14  16   CREATSERUM  0.95   --   0.95  0.75   GFRAA  >60   --   >60  >60   GFRNAA  >60   --   >60  >60   CA  7.9*   --   8.6  8.6   NA  142   --   142  138   K  3.6  3.7  4.2  3.9   CL  111*   --   110  1

## 2017-03-19 NOTE — PROGRESS NOTES
[facility]    Cardiology Progress Note    Veleta Shoulders Patient Status:  Inpatient    10/9/1960 MRN [id number]   Location [facility] 2W/SW Attending Warren Holt MD   Hosp Day # 6 PCP No primary care provider on file. 03/19/2017    03/19/2017   CO2 27 03/19/2017   * 03/19/2017   CA 8.6 03/19/2017   ALB 3.8 03/14/2017   ALKPHO 71 03/14/2017   BILT 0.5 03/14/2017   TP 6.9 03/14/2017   AST 24 03/14/2017   ALT 37 03/14/2017   PTT 37.8* 03/16/2017   INR 1.6* 03/

## 2017-03-19 NOTE — DISCHARGE PLANNING
MD order received regarding [facility] for tentative DC on Monday 3/20. Updated information has been given to [facility].       [facility] ext [phone]

## 2017-03-20 ENCOUNTER — TELEPHONE (OUTPATIENT)
Dept: CARDIOLOGY CLINIC | Facility: CLINIC | Age: 57
End: 2017-03-20

## 2017-03-20 ENCOUNTER — APPOINTMENT (OUTPATIENT)
Dept: GENERAL RADIOLOGY | Facility: HOSPITAL | Age: 57
DRG: 236 | End: 2017-03-20
Attending: THORACIC SURGERY (CARDIOTHORACIC VASCULAR SURGERY)
Payer: COMMERCIAL

## 2017-03-20 VITALS
WEIGHT: 173.63 LBS | OXYGEN SATURATION: 96 % | TEMPERATURE: 99 F | DIASTOLIC BLOOD PRESSURE: 68 MMHG | BODY MASS INDEX: 30 KG/M2 | HEART RATE: 74 BPM | SYSTOLIC BLOOD PRESSURE: 110 MMHG | RESPIRATION RATE: 19 BRPM

## 2017-03-20 LAB
BASOPHILS # BLD: 0 K/UL (ref 0–0.2)
BASOPHILS NFR BLD: 0 %
BLOOD TYPE BARCODE: 5100
EOSINOPHIL # BLD: 0.3 K/UL (ref 0–0.7)
EOSINOPHIL NFR BLD: 4 %
ERYTHROCYTE [DISTWIDTH] IN BLOOD BY AUTOMATED COUNT: 13.5 % (ref 11–15)
HCT VFR BLD AUTO: 22.9 % (ref 41–52)
HGB BLD-MCNC: 7.9 G/DL (ref 13.5–17.5)
LYMPHOCYTES # BLD: 1.4 K/UL (ref 1–4)
LYMPHOCYTES NFR BLD: 19 %
MCH RBC QN AUTO: 30.3 PG (ref 27–32)
MCHC RBC AUTO-ENTMCNC: 34.4 G/DL (ref 32–37)
MCV RBC AUTO: 88 FL (ref 80–100)
MONOCYTES # BLD: 0.8 K/UL (ref 0–1)
MONOCYTES NFR BLD: 11 %
NEUTROPHILS # BLD AUTO: 4.9 K/UL (ref 1.8–7.7)
NEUTROPHILS NFR BLD: 66 %
PLATELET # BLD AUTO: 161 K/UL (ref 140–400)
PMV BLD AUTO: 8.5 FL (ref 7.4–10.3)
RBC # BLD AUTO: 2.61 M/UL (ref 4.5–5.9)
WBC # BLD AUTO: 7.4 K/UL (ref 4–11)

## 2017-03-20 PROCEDURE — 99232 SBSQ HOSP IP/OBS MODERATE 35: CPT | Performed by: OTHER

## 2017-03-20 PROCEDURE — 71020 XR CHEST PA + LAT CHEST (CPT=71020): CPT

## 2017-03-20 PROCEDURE — 99239 HOSP IP/OBS DSCHRG MGMT >30: CPT | Performed by: HOSPITALIST

## 2017-03-20 RX ORDER — MELATONIN
325
Qty: 90 TABLET | Refills: 0 | Status: SHIPPED | OUTPATIENT
Start: 2017-03-20 | End: 2017-04-19

## 2017-03-20 RX ORDER — CLOPIDOGREL BISULFATE 75 MG/1
75 TABLET ORAL DAILY
Qty: 30 TABLET | Refills: 0 | Status: SHIPPED | OUTPATIENT
Start: 2017-03-20 | End: 2017-09-26 | Stop reason: ALTCHOICE

## 2017-03-20 RX ORDER — HYDROCODONE BITARTRATE AND ACETAMINOPHEN 7.5; 325 MG/1; MG/1
1 TABLET ORAL EVERY 4 HOURS PRN
Qty: 30 TABLET | Refills: 0 | Status: SHIPPED | OUTPATIENT
Start: 2017-03-20 | End: 2017-06-12

## 2017-03-20 RX ORDER — ASCORBIC ACID 500 MG
500 TABLET ORAL 3 TIMES DAILY
Qty: 42 TABLET | Refills: 0 | Status: SHIPPED | OUTPATIENT
Start: 2017-03-20 | End: 2017-04-03

## 2017-03-20 RX ORDER — DOXEPIN HYDROCHLORIDE 50 MG/1
1 CAPSULE ORAL DAILY
Qty: 30 TABLET | Refills: 0 | Status: SHIPPED | OUTPATIENT
Start: 2017-03-20

## 2017-03-20 RX ORDER — PSEUDOEPHEDRINE HCL 30 MG
100 TABLET ORAL 2 TIMES DAILY
Qty: 60 CAPSULE | Refills: 0 | Status: SHIPPED | OUTPATIENT
Start: 2017-03-20 | End: 2017-06-12

## 2017-03-20 NOTE — DISCHARGE SUMMARY
[facility] HOSPITALIST  DISCHARGE SUMMARY     [address] Colvin Patient Status:  Inpatient    10/9/1960 MRN [id number]   Location [facility] 2W/SW Attending Chani Morel MD   Hosp Day # 7 PCP No primary care provider on file.      DATE OF ADMISSI ()/(52-82) 103/52 mmHg    Physical Exam:    Gen: A+Ox3. No distress. HEENT: NCAT, neck supple, no carotid bruit. CV: RRR, S1S2, and intact distal pulses. No gallop, rub, murmur.   Pulm: Effort and breath sounds normal. No distress, wheezes, rales, this was given:  12.5 mg on 3/20/2017  6:41 AM   Commonly known as:  LOPRESSOR        Take 0.5 tablets (12.5 mg total) by mouth 2x Daily(Beta Blocker).     Quantity:  60 tablet   Refills:  0       multivitamin Tabs   Last time this was given:  1 tablet on 3 appt at 10:30a    Zain Dawson MD  [facility] [place] [phone]    In 4 weeks      The above plan and follow-up instructions were reviewed with the patient and they verbalized understanding and agreement.   They understand to return t

## 2017-03-20 NOTE — TELEPHONE ENCOUNTER
Chevy requesting pt to be added on LB's schedule for Monday. 4/3/2017. Pls call to confirm. Thank you.

## 2017-03-20 NOTE — PROGRESS NOTES
Neurology Inpatient Follow-up Note      HPI:     Patient feeling better. Episodes diminished. He notices they increase in frequency following Norco administration.       Past Medical Hisotory:  Reviewed    Medications:  Reviewed    Allergies:  No Known Al

## 2017-03-20 NOTE — PROGRESS NOTES
[facility] AND [facility]  Progress Note    [address] Lockhart Patient Status:  Inpatient    10/9/1960 MRN [id number]   Location [place] [address]/ Attending Sid Fsos MD   Hosp Day # 7 PCP No primary care provider on file. Assessment:    1. CAD. Mouth/Throat [email or web address]   • multivitamin  1 tablet Oral Daily   • Vitamin C  500 mg Oral TID   • mupirocin  1 Application Nasal BID   • Clopidogrel Bisulfate  75 mg Oral Daily   • aspirin  81 mg Oral Daily   • metoprolol Tartrate  12.5 mg Oral 2x Daily(

## 2017-03-20 NOTE — DISCHARGE PLANNING
LAMONT following up on d/c planning for the patient. He is stable for d/c today to home with [facility] and [facility].     Ruperto Wolf [facility]  [id number]

## 2017-03-20 NOTE — PAYOR COMM NOTE
Mally Ochoa #[id number]  (55 year old M)       [facility] [id number]         Sancho Carlos MD Physician Signed  Progress Notes 3/20/2017 10:33 AM      Expand All Collapse All    Neurology Inpatient Follow-up Note        HPI:      Patient feeling bett [facility] Progress  Note        [facility] Patient Status:  Inpatient      [birth year]  56 year old  CSN  [id number]    Location  233-A  Attending  Iqra Rojo MD    Hosp Day #  7  PCP  No primary care p BUN   16    CREATSERUM   0.75    GFRAA   >60    GFRNAA   >60    CA   8.6    NA   593    K   3.9    CL   106    CO2   27      No results for input(s): PT, INR, PTT in the last 72 hours.                            Gavin Mac #[id number]  (56 year    Neurological:  AAOx3    Assessment/Plan:  S/P CABG x 3 POD # 4  Encourage pulm toilet; IS & Acapella  Pain meds as needed  Increase activity- shower today    Remove pacing wires today  SCDs and Lovenox prophylaxis DVT prevention    Expected post op volu

## 2017-03-20 NOTE — PLAN OF CARE
Problem: SAFETY ADULT - FALL  Goal: Free from fall injury  INTERVENTIONS:  - Assess pt frequently for physical needs  - Identify cognitive and physical deficits and behaviors that affect risk of falls.   - Pierz fall precautions as indicated by assessme Monitor labs and vital signs for trends  - Administer supportive blood products/factors, fluids and medications as ordered and appropriate  - Administer supportive blood products/factors as ordered and appropriate   Outcome: Not Progressing  RECHECK HGB

## 2017-03-20 NOTE — PROGRESS NOTES
[place] [facility] - [facility]    Progress Note    [address] Patient Status:  Inpatient    10/9/1960 MRN [id number]   Location [facility] 2W/SW Attending Sha Morton MD   Hosp Day # 7 PCP No primary care provider on file.      Subjective today  Written and verbal info provided to pt. Regarding recovery care  Will make F/U appt. For pt.              Bernarda Atwood  3/20/2017  9:39 AM

## 2017-03-20 NOTE — TELEPHONE ENCOUNTER
Left message on patient cell#,  home number message box full. Dr Natan Valdes has a full schedule for 4-3-17.  Patient can see Maria M GARCIA 4-3-17 for CABG f/u

## 2017-03-20 NOTE — PROGRESS NOTES
[place] [facility] - [facility]  Hospitalist Progress  Note     Anusha Hankins Patient Status:  Inpatient      60year old [facility] [id number]   Location -A Attending Marquis Mariela MD   Hosp Day # 7 PCP No primary care provider on file.      AS K  3.9   CL  106   CO2  27     No results for input(s): PT, INR, PTT in the last 72 hours.

## 2017-03-21 NOTE — TELEPHONE ENCOUNTER
Future Appointments  Date Time Provider Kiki Beltran   3/24/2017 10:30 AM RADHA Carlton [facility] Jeremy Campoverde   3/29/2017 10:15 AM CRISTIAN Dong [facility]

## 2017-03-24 ENCOUNTER — APPOINTMENT (OUTPATIENT)
Dept: LAB | Age: 57
End: 2017-03-24
Attending: NURSE PRACTITIONER
Payer: COMMERCIAL

## 2017-03-24 ENCOUNTER — OFFICE VISIT (OUTPATIENT)
Dept: CARDIOLOGY CLINIC | Facility: CLINIC | Age: 57
End: 2017-03-24

## 2017-03-24 VITALS
BODY MASS INDEX: 28.85 KG/M2 | SYSTOLIC BLOOD PRESSURE: 104 MMHG | HEART RATE: 65 BPM | RESPIRATION RATE: 20 BRPM | DIASTOLIC BLOOD PRESSURE: 68 MMHG | HEIGHT: 64 IN | WEIGHT: 169 LBS

## 2017-03-24 DIAGNOSIS — I25.10 CAD, MULTIPLE VESSEL: ICD-10-CM

## 2017-03-24 DIAGNOSIS — I25.10 ATHEROSCLEROSIS OF NATIVE CORONARY ARTERY OF NATIVE HEART WITHOUT ANGINA PECTORIS: ICD-10-CM

## 2017-03-24 DIAGNOSIS — I25.10 ATHEROSCLEROSIS OF NATIVE CORONARY ARTERY OF NATIVE HEART WITHOUT ANGINA PECTORIS: Primary | ICD-10-CM

## 2017-03-24 DIAGNOSIS — Z95.1 S/P CABG X 2: ICD-10-CM

## 2017-03-24 LAB
ERYTHROCYTE [DISTWIDTH] IN BLOOD BY AUTOMATED COUNT: 13.5 % (ref 11–15)
HCT VFR BLD AUTO: 28.4 % (ref 41–52)
HGB BLD-MCNC: 9.4 G/DL (ref 13.5–17.5)
MCH RBC QN AUTO: 29.5 PG (ref 27–32)
MCHC RBC AUTO-ENTMCNC: 33.3 G/DL (ref 32–37)
MCV RBC AUTO: 88.6 FL (ref 80–100)
PLATELET # BLD AUTO: 507 K/UL (ref 140–400)
PMV BLD AUTO: 7.2 FL (ref 7.4–10.3)
RBC # BLD AUTO: 3.21 M/UL (ref 4.5–5.9)
WBC # BLD AUTO: 10.6 K/UL (ref 4–11)

## 2017-03-24 PROCEDURE — 85027 COMPLETE CBC AUTOMATED: CPT

## 2017-03-24 PROCEDURE — 99212 OFFICE O/P EST SF 10 MIN: CPT | Performed by: NURSE PRACTITIONER

## 2017-03-24 PROCEDURE — 36415 COLL VENOUS BLD VENIPUNCTURE: CPT

## 2017-03-24 PROCEDURE — 99214 OFFICE O/P EST MOD 30 MIN: CPT | Performed by: NURSE PRACTITIONER

## 2017-03-24 NOTE — PROGRESS NOTES
Bam Solorzano is a 60year old male. Patient presents with:  CAD: Pt had CABG done 03/16/17. c/o mild tiredness and occasional dyspnea. Chest incision is healing well. HPI:   Patient comes in today for hospital follow-up he sees Dr. Phylicia Donis.   He had a daily. Disp:  Rfl:       Past Medical History   Diagnosis Date   • High cholesterol    • Visual impairment    • Esophageal reflux    • BPH (benign prostatic hyperplasia)    • Prediabetes       Social History:    Smoking Status: Never Smoker

## 2017-03-27 ENCOUNTER — LAB REQUISITION (OUTPATIENT)
Dept: LAB | Facility: HOSPITAL | Age: 57
End: 2017-03-27
Payer: COMMERCIAL

## 2017-03-27 ENCOUNTER — PRIOR ORIGINAL RECORDS (OUTPATIENT)
Dept: OTHER | Age: 57
End: 2017-03-27

## 2017-03-27 DIAGNOSIS — Z48.812 ENCOUNTER FOR SURGICAL AFTERCARE FOLLOWING SURGERY OF CIRCULATORY SYSTEM: ICD-10-CM

## 2017-03-27 LAB
BASOPHILS # BLD: 0.1 K/UL (ref 0–0.2)
BASOPHILS NFR BLD: 1 %
EOSINOPHIL # BLD: 0.3 K/UL (ref 0–0.7)
EOSINOPHIL NFR BLD: 3 %
ERYTHROCYTE [DISTWIDTH] IN BLOOD BY AUTOMATED COUNT: 14.1 % (ref 11–15)
HCT VFR BLD AUTO: 28.7 % (ref 41–52)
HGB BLD-MCNC: 9.6 G/DL (ref 13.5–17.5)
LYMPHOCYTES # BLD: 1.5 K/UL (ref 1–4)
LYMPHOCYTES NFR BLD: 16 %
MCH RBC QN AUTO: 29.8 PG (ref 27–32)
MCHC RBC AUTO-ENTMCNC: 33.5 G/DL (ref 32–37)
MCV RBC AUTO: 88.8 FL (ref 80–100)
MONOCYTES # BLD: 0.7 K/UL (ref 0–1)
MONOCYTES NFR BLD: 8 %
NEUTROPHILS # BLD AUTO: 6.9 K/UL (ref 1.8–7.7)
NEUTROPHILS NFR BLD: 73 %
PLATELET # BLD AUTO: 608 K/UL (ref 140–400)
PMV BLD AUTO: 7 FL (ref 7.4–10.3)
RBC # BLD AUTO: 3.24 M/UL (ref 4.5–5.9)
WBC # BLD AUTO: 9.4 K/UL (ref 4–11)

## 2017-03-27 PROCEDURE — 85025 COMPLETE CBC W/AUTO DIFF WBC: CPT | Performed by: THORACIC SURGERY (CARDIOTHORACIC VASCULAR SURGERY)

## 2017-03-28 ENCOUNTER — TELEPHONE (OUTPATIENT)
Dept: MEDSURG UNIT | Facility: HOSPITAL | Age: 57
End: 2017-03-28

## 2017-04-03 ENCOUNTER — CARDPULM VISIT (OUTPATIENT)
Dept: CARDIAC REHAB | Facility: HOSPITAL | Age: 57
End: 2017-04-03
Attending: INTERNAL MEDICINE
Payer: COMMERCIAL

## 2017-04-03 PROCEDURE — 93798 PHYS/QHP OP CAR RHAB W/ECG: CPT

## 2017-04-05 ENCOUNTER — CARDPULM VISIT (OUTPATIENT)
Dept: CARDIAC REHAB | Facility: HOSPITAL | Age: 57
End: 2017-04-05
Attending: INTERNAL MEDICINE
Payer: COMMERCIAL

## 2017-04-05 PROCEDURE — 93798 PHYS/QHP OP CAR RHAB W/ECG: CPT

## 2017-04-07 ENCOUNTER — APPOINTMENT (OUTPATIENT)
Dept: CARDIAC REHAB | Facility: HOSPITAL | Age: 57
End: 2017-04-07
Payer: COMMERCIAL

## 2017-04-07 ENCOUNTER — CARDPULM VISIT (OUTPATIENT)
Dept: CARDIAC REHAB | Facility: HOSPITAL | Age: 57
End: 2017-04-07
Attending: INTERNAL MEDICINE
Payer: COMMERCIAL

## 2017-04-07 PROCEDURE — 93798 PHYS/QHP OP CAR RHAB W/ECG: CPT

## 2017-04-10 ENCOUNTER — CARDPULM VISIT (OUTPATIENT)
Dept: CARDIAC REHAB | Facility: HOSPITAL | Age: 57
End: 2017-04-10
Attending: INTERNAL MEDICINE
Payer: COMMERCIAL

## 2017-04-10 ENCOUNTER — APPOINTMENT (OUTPATIENT)
Dept: CARDIAC REHAB | Facility: HOSPITAL | Age: 57
End: 2017-04-10
Payer: COMMERCIAL

## 2017-04-10 PROCEDURE — 93798 PHYS/QHP OP CAR RHAB W/ECG: CPT

## 2017-04-12 ENCOUNTER — CARDPULM VISIT (OUTPATIENT)
Dept: CARDIAC REHAB | Facility: HOSPITAL | Age: 57
End: 2017-04-12
Attending: INTERNAL MEDICINE
Payer: COMMERCIAL

## 2017-04-12 ENCOUNTER — APPOINTMENT (OUTPATIENT)
Dept: CARDIAC REHAB | Facility: HOSPITAL | Age: 57
End: 2017-04-12
Payer: COMMERCIAL

## 2017-04-12 PROCEDURE — 93798 PHYS/QHP OP CAR RHAB W/ECG: CPT

## 2017-04-14 ENCOUNTER — CARDPULM VISIT (OUTPATIENT)
Dept: CARDIAC REHAB | Facility: HOSPITAL | Age: 57
End: 2017-04-14
Attending: INTERNAL MEDICINE
Payer: COMMERCIAL

## 2017-04-14 ENCOUNTER — APPOINTMENT (OUTPATIENT)
Dept: CARDIAC REHAB | Facility: HOSPITAL | Age: 57
End: 2017-04-14
Payer: COMMERCIAL

## 2017-04-14 PROCEDURE — 93798 PHYS/QHP OP CAR RHAB W/ECG: CPT

## 2017-04-17 ENCOUNTER — APPOINTMENT (OUTPATIENT)
Dept: CARDIAC REHAB | Facility: HOSPITAL | Age: 57
End: 2017-04-17
Payer: COMMERCIAL

## 2017-04-17 ENCOUNTER — CARDPULM VISIT (OUTPATIENT)
Dept: CARDIAC REHAB | Facility: HOSPITAL | Age: 57
End: 2017-04-17
Attending: INTERNAL MEDICINE
Payer: COMMERCIAL

## 2017-04-17 PROCEDURE — 93798 PHYS/QHP OP CAR RHAB W/ECG: CPT

## 2017-04-19 ENCOUNTER — APPOINTMENT (OUTPATIENT)
Dept: CARDIAC REHAB | Facility: HOSPITAL | Age: 57
End: 2017-04-19
Payer: COMMERCIAL

## 2017-04-19 ENCOUNTER — CARDPULM VISIT (OUTPATIENT)
Dept: CARDIAC REHAB | Facility: HOSPITAL | Age: 57
End: 2017-04-19
Attending: INTERNAL MEDICINE
Payer: COMMERCIAL

## 2017-04-19 PROCEDURE — 93798 PHYS/QHP OP CAR RHAB W/ECG: CPT

## 2017-04-21 ENCOUNTER — CARDPULM VISIT (OUTPATIENT)
Dept: CARDIAC REHAB | Facility: HOSPITAL | Age: 57
End: 2017-04-21
Attending: INTERNAL MEDICINE
Payer: COMMERCIAL

## 2017-04-21 ENCOUNTER — APPOINTMENT (OUTPATIENT)
Dept: CARDIAC REHAB | Facility: HOSPITAL | Age: 57
End: 2017-04-21
Payer: COMMERCIAL

## 2017-04-21 PROCEDURE — 93798 PHYS/QHP OP CAR RHAB W/ECG: CPT

## 2017-04-24 ENCOUNTER — CARDPULM VISIT (OUTPATIENT)
Dept: CARDIAC REHAB | Facility: HOSPITAL | Age: 57
End: 2017-04-24
Attending: INTERNAL MEDICINE
Payer: COMMERCIAL

## 2017-04-24 ENCOUNTER — APPOINTMENT (OUTPATIENT)
Dept: CARDIAC REHAB | Facility: HOSPITAL | Age: 57
End: 2017-04-24
Payer: COMMERCIAL

## 2017-04-24 ENCOUNTER — OFFICE VISIT (OUTPATIENT)
Dept: CARDIOLOGY CLINIC | Facility: CLINIC | Age: 57
End: 2017-04-24

## 2017-04-24 VITALS
SYSTOLIC BLOOD PRESSURE: 110 MMHG | BODY MASS INDEX: 28.34 KG/M2 | HEART RATE: 60 BPM | DIASTOLIC BLOOD PRESSURE: 70 MMHG | WEIGHT: 166 LBS | RESPIRATION RATE: 18 BRPM | HEIGHT: 64 IN

## 2017-04-24 DIAGNOSIS — I25.10 CAD, MULTIPLE VESSEL: Primary | ICD-10-CM

## 2017-04-24 PROCEDURE — 99212 OFFICE O/P EST SF 10 MIN: CPT | Performed by: INTERNAL MEDICINE

## 2017-04-24 PROCEDURE — 99214 OFFICE O/P EST MOD 30 MIN: CPT | Performed by: INTERNAL MEDICINE

## 2017-04-24 PROCEDURE — 93798 PHYS/QHP OP CAR RHAB W/ECG: CPT

## 2017-04-24 RX ORDER — ASCORBIC ACID 500 MG
500 TABLET ORAL DAILY
COMMUNITY
End: 2017-06-12

## 2017-04-24 RX ORDER — MELATONIN
325 2 TIMES DAILY WITH MEALS
COMMUNITY
End: 2017-09-26 | Stop reason: ALTCHOICE

## 2017-04-24 RX ORDER — CYCLOBENZAPRINE HCL 5 MG
5 TABLET ORAL 3 TIMES DAILY PRN
COMMUNITY
End: 2017-06-12

## 2017-04-24 NOTE — PROGRESS NOTES
Joon Calix is a 60year old male. Patient presents with:  CAD: CABG X3 3-16-17  Hyperlipidemia  Dizziness: accompanied by right sided chest pain, fatigue & nausea , 2 episodes since CABG    HPI:   Marlo Bumpers is doing well post bypass he has multiple ques with exertion  CARDIOVASCULAR: chest pain  GI: denies abdominal pain and denies heartburn  NEURO: denies headaches    EXAM:   /70 mmHg  Pulse 60  Resp 18  Ht 5' 4\" (1.626 m)  Wt 166 lb (75.297 kg)  BMI 28.48 kg/m2  GENERAL: well developed, well nour

## 2017-04-26 ENCOUNTER — CARDPULM VISIT (OUTPATIENT)
Dept: CARDIAC REHAB | Facility: HOSPITAL | Age: 57
End: 2017-04-26
Attending: INTERNAL MEDICINE
Payer: COMMERCIAL

## 2017-04-26 ENCOUNTER — APPOINTMENT (OUTPATIENT)
Dept: CARDIAC REHAB | Facility: HOSPITAL | Age: 57
End: 2017-04-26
Payer: COMMERCIAL

## 2017-04-26 LAB
HEMATOCRIT: 28.7 %
HEMOGLOBIN: 9.6 G/DL
PLATELETS: 608 K/UL
RED BLOOD COUNT: 3.24 X 10-6/U
WHITE BLOOD COUNT: 9.4 X 10-3/U

## 2017-04-26 PROCEDURE — 93798 PHYS/QHP OP CAR RHAB W/ECG: CPT

## 2017-04-28 ENCOUNTER — CARDPULM VISIT (OUTPATIENT)
Dept: CARDIAC REHAB | Facility: HOSPITAL | Age: 57
End: 2017-04-28
Attending: INTERNAL MEDICINE
Payer: COMMERCIAL

## 2017-04-28 ENCOUNTER — APPOINTMENT (OUTPATIENT)
Dept: CARDIAC REHAB | Facility: HOSPITAL | Age: 57
End: 2017-04-28
Payer: COMMERCIAL

## 2017-04-28 PROCEDURE — 93798 PHYS/QHP OP CAR RHAB W/ECG: CPT

## 2017-05-01 ENCOUNTER — APPOINTMENT (OUTPATIENT)
Dept: CARDIAC REHAB | Facility: HOSPITAL | Age: 57
End: 2017-05-01
Payer: COMMERCIAL

## 2017-05-01 ENCOUNTER — CARDPULM VISIT (OUTPATIENT)
Dept: CARDIAC REHAB | Facility: HOSPITAL | Age: 57
End: 2017-05-01
Attending: INTERNAL MEDICINE
Payer: COMMERCIAL

## 2017-05-01 PROCEDURE — 93798 PHYS/QHP OP CAR RHAB W/ECG: CPT

## 2017-05-03 ENCOUNTER — APPOINTMENT (OUTPATIENT)
Dept: CARDIAC REHAB | Facility: HOSPITAL | Age: 57
End: 2017-05-03
Payer: COMMERCIAL

## 2017-05-03 ENCOUNTER — CARDPULM VISIT (OUTPATIENT)
Dept: CARDIAC REHAB | Facility: HOSPITAL | Age: 57
End: 2017-05-03
Attending: INTERNAL MEDICINE
Payer: COMMERCIAL

## 2017-05-03 PROCEDURE — 93798 PHYS/QHP OP CAR RHAB W/ECG: CPT

## 2017-05-05 ENCOUNTER — APPOINTMENT (OUTPATIENT)
Dept: CARDIAC REHAB | Facility: HOSPITAL | Age: 57
End: 2017-05-05
Payer: COMMERCIAL

## 2017-05-05 ENCOUNTER — CARDPULM VISIT (OUTPATIENT)
Dept: CARDIAC REHAB | Facility: HOSPITAL | Age: 57
End: 2017-05-05
Attending: INTERNAL MEDICINE
Payer: COMMERCIAL

## 2017-05-05 PROCEDURE — 93798 PHYS/QHP OP CAR RHAB W/ECG: CPT

## 2017-05-08 ENCOUNTER — APPOINTMENT (OUTPATIENT)
Dept: CARDIAC REHAB | Facility: HOSPITAL | Age: 57
End: 2017-05-08
Payer: COMMERCIAL

## 2017-05-10 ENCOUNTER — APPOINTMENT (OUTPATIENT)
Dept: CARDIAC REHAB | Facility: HOSPITAL | Age: 57
End: 2017-05-10
Payer: COMMERCIAL

## 2017-05-12 ENCOUNTER — APPOINTMENT (OUTPATIENT)
Dept: CARDIAC REHAB | Facility: HOSPITAL | Age: 57
End: 2017-05-12
Payer: COMMERCIAL

## 2017-05-12 ENCOUNTER — CARDPULM VISIT (OUTPATIENT)
Dept: CARDIAC REHAB | Facility: HOSPITAL | Age: 57
End: 2017-05-12
Attending: INTERNAL MEDICINE
Payer: COMMERCIAL

## 2017-05-12 PROCEDURE — 93798 PHYS/QHP OP CAR RHAB W/ECG: CPT

## 2017-05-15 ENCOUNTER — APPOINTMENT (OUTPATIENT)
Dept: CARDIAC REHAB | Facility: HOSPITAL | Age: 57
End: 2017-05-15
Payer: COMMERCIAL

## 2017-05-15 ENCOUNTER — CARDPULM VISIT (OUTPATIENT)
Dept: CARDIAC REHAB | Facility: HOSPITAL | Age: 57
End: 2017-05-15
Attending: INTERNAL MEDICINE
Payer: COMMERCIAL

## 2017-05-15 PROCEDURE — 93798 PHYS/QHP OP CAR RHAB W/ECG: CPT

## 2017-05-17 ENCOUNTER — CARDPULM VISIT (OUTPATIENT)
Dept: CARDIAC REHAB | Facility: HOSPITAL | Age: 57
End: 2017-05-17
Attending: INTERNAL MEDICINE
Payer: COMMERCIAL

## 2017-05-17 ENCOUNTER — APPOINTMENT (OUTPATIENT)
Dept: CARDIAC REHAB | Facility: HOSPITAL | Age: 57
End: 2017-05-17
Payer: COMMERCIAL

## 2017-05-17 PROCEDURE — 93798 PHYS/QHP OP CAR RHAB W/ECG: CPT

## 2017-05-19 ENCOUNTER — CARDPULM VISIT (OUTPATIENT)
Dept: CARDIAC REHAB | Facility: HOSPITAL | Age: 57
End: 2017-05-19
Attending: INTERNAL MEDICINE
Payer: COMMERCIAL

## 2017-05-19 ENCOUNTER — APPOINTMENT (OUTPATIENT)
Dept: CARDIAC REHAB | Facility: HOSPITAL | Age: 57
End: 2017-05-19
Payer: COMMERCIAL

## 2017-05-19 PROCEDURE — 93798 PHYS/QHP OP CAR RHAB W/ECG: CPT

## 2017-05-22 ENCOUNTER — TELEPHONE (OUTPATIENT)
Dept: CARDIOLOGY CLINIC | Facility: CLINIC | Age: 57
End: 2017-05-22

## 2017-05-22 ENCOUNTER — CARDPULM VISIT (OUTPATIENT)
Dept: CARDIAC REHAB | Facility: HOSPITAL | Age: 57
End: 2017-05-22
Attending: INTERNAL MEDICINE
Payer: COMMERCIAL

## 2017-05-22 ENCOUNTER — APPOINTMENT (OUTPATIENT)
Dept: CARDIAC REHAB | Facility: HOSPITAL | Age: 57
End: 2017-05-22
Payer: COMMERCIAL

## 2017-05-22 PROCEDURE — 93798 PHYS/QHP OP CAR RHAB W/ECG: CPT

## 2017-05-22 NOTE — TELEPHONE ENCOUNTER
Pt. States that he is having a really bad tooth ache and wants to know if it is ok for him to get dental work done, as he just had a triple by-pass surgery on 3/16/17?

## 2017-05-22 NOTE — TELEPHONE ENCOUNTER
Pt aware to made his dental appointment. Pt does not need SBE, but must remain on Aspirin and Plavix throughout.  Pt v/u

## 2017-05-24 ENCOUNTER — APPOINTMENT (OUTPATIENT)
Dept: CARDIAC REHAB | Facility: HOSPITAL | Age: 57
End: 2017-05-24
Payer: COMMERCIAL

## 2017-05-24 ENCOUNTER — CARDPULM VISIT (OUTPATIENT)
Dept: CARDIAC REHAB | Facility: HOSPITAL | Age: 57
End: 2017-05-24
Attending: INTERNAL MEDICINE
Payer: COMMERCIAL

## 2017-05-24 PROCEDURE — 93798 PHYS/QHP OP CAR RHAB W/ECG: CPT

## 2017-05-26 ENCOUNTER — APPOINTMENT (OUTPATIENT)
Dept: CARDIAC REHAB | Facility: HOSPITAL | Age: 57
End: 2017-05-26
Payer: COMMERCIAL

## 2017-05-26 ENCOUNTER — CARDPULM VISIT (OUTPATIENT)
Dept: CARDIAC REHAB | Facility: HOSPITAL | Age: 57
End: 2017-05-26
Attending: INTERNAL MEDICINE
Payer: COMMERCIAL

## 2017-05-26 PROCEDURE — 93798 PHYS/QHP OP CAR RHAB W/ECG: CPT

## 2017-05-31 ENCOUNTER — CARDPULM VISIT (OUTPATIENT)
Dept: CARDIAC REHAB | Facility: HOSPITAL | Age: 57
End: 2017-05-31
Attending: INTERNAL MEDICINE
Payer: COMMERCIAL

## 2017-05-31 ENCOUNTER — APPOINTMENT (OUTPATIENT)
Dept: CARDIAC REHAB | Facility: HOSPITAL | Age: 57
End: 2017-05-31
Payer: COMMERCIAL

## 2017-05-31 PROCEDURE — 93798 PHYS/QHP OP CAR RHAB W/ECG: CPT

## 2017-06-02 ENCOUNTER — APPOINTMENT (OUTPATIENT)
Dept: CARDIAC REHAB | Facility: HOSPITAL | Age: 57
End: 2017-06-02
Payer: COMMERCIAL

## 2017-06-02 ENCOUNTER — CARDPULM VISIT (OUTPATIENT)
Dept: CARDIAC REHAB | Facility: HOSPITAL | Age: 57
End: 2017-06-02
Attending: INTERNAL MEDICINE
Payer: COMMERCIAL

## 2017-06-02 PROCEDURE — 93798 PHYS/QHP OP CAR RHAB W/ECG: CPT

## 2017-06-05 ENCOUNTER — APPOINTMENT (OUTPATIENT)
Dept: CARDIAC REHAB | Facility: HOSPITAL | Age: 57
End: 2017-06-05
Payer: COMMERCIAL

## 2017-06-07 ENCOUNTER — APPOINTMENT (OUTPATIENT)
Dept: CARDIAC REHAB | Facility: HOSPITAL | Age: 57
End: 2017-06-07
Payer: COMMERCIAL

## 2017-06-07 ENCOUNTER — CARDPULM VISIT (OUTPATIENT)
Dept: CARDIAC REHAB | Facility: HOSPITAL | Age: 57
End: 2017-06-07
Attending: INTERNAL MEDICINE
Payer: COMMERCIAL

## 2017-06-07 PROCEDURE — 93798 PHYS/QHP OP CAR RHAB W/ECG: CPT

## 2017-06-09 ENCOUNTER — CARDPULM VISIT (OUTPATIENT)
Dept: CARDIAC REHAB | Facility: HOSPITAL | Age: 57
End: 2017-06-09
Attending: INTERNAL MEDICINE
Payer: COMMERCIAL

## 2017-06-09 ENCOUNTER — APPOINTMENT (OUTPATIENT)
Dept: CARDIAC REHAB | Facility: HOSPITAL | Age: 57
End: 2017-06-09
Payer: COMMERCIAL

## 2017-06-09 PROCEDURE — 93798 PHYS/QHP OP CAR RHAB W/ECG: CPT

## 2017-06-12 ENCOUNTER — APPOINTMENT (OUTPATIENT)
Dept: CARDIAC REHAB | Facility: HOSPITAL | Age: 57
End: 2017-06-12
Payer: COMMERCIAL

## 2017-06-12 ENCOUNTER — OFFICE VISIT (OUTPATIENT)
Dept: CARDIOLOGY CLINIC | Facility: CLINIC | Age: 57
End: 2017-06-12

## 2017-06-12 ENCOUNTER — CARDPULM VISIT (OUTPATIENT)
Dept: CARDIAC REHAB | Facility: HOSPITAL | Age: 57
End: 2017-06-12
Attending: INTERNAL MEDICINE
Payer: COMMERCIAL

## 2017-06-12 VITALS
RESPIRATION RATE: 18 BRPM | HEIGHT: 64.25 IN | SYSTOLIC BLOOD PRESSURE: 110 MMHG | WEIGHT: 168 LBS | BODY MASS INDEX: 28.68 KG/M2 | DIASTOLIC BLOOD PRESSURE: 70 MMHG | HEART RATE: 56 BPM

## 2017-06-12 DIAGNOSIS — I25.10 CORONARY ARTERY DISEASE INVOLVING NATIVE CORONARY ARTERY OF NATIVE HEART WITHOUT ANGINA PECTORIS: Primary | ICD-10-CM

## 2017-06-12 PROCEDURE — 99214 OFFICE O/P EST MOD 30 MIN: CPT | Performed by: INTERNAL MEDICINE

## 2017-06-12 PROCEDURE — 93798 PHYS/QHP OP CAR RHAB W/ECG: CPT

## 2017-06-12 PROCEDURE — 99212 OFFICE O/P EST SF 10 MIN: CPT | Performed by: INTERNAL MEDICINE

## 2017-06-14 ENCOUNTER — APPOINTMENT (OUTPATIENT)
Dept: CARDIAC REHAB | Facility: HOSPITAL | Age: 57
End: 2017-06-14
Payer: COMMERCIAL

## 2017-06-14 ENCOUNTER — CARDPULM VISIT (OUTPATIENT)
Dept: CARDIAC REHAB | Facility: HOSPITAL | Age: 57
End: 2017-06-14
Attending: INTERNAL MEDICINE
Payer: COMMERCIAL

## 2017-06-14 PROCEDURE — 93798 PHYS/QHP OP CAR RHAB W/ECG: CPT

## 2017-06-16 ENCOUNTER — APPOINTMENT (OUTPATIENT)
Dept: CARDIAC REHAB | Facility: HOSPITAL | Age: 57
End: 2017-06-16
Payer: COMMERCIAL

## 2017-06-19 ENCOUNTER — APPOINTMENT (OUTPATIENT)
Dept: CARDIAC REHAB | Facility: HOSPITAL | Age: 57
End: 2017-06-19
Payer: COMMERCIAL

## 2017-06-21 ENCOUNTER — APPOINTMENT (OUTPATIENT)
Dept: CARDIAC REHAB | Facility: HOSPITAL | Age: 57
End: 2017-06-21
Payer: COMMERCIAL

## 2017-06-23 ENCOUNTER — APPOINTMENT (OUTPATIENT)
Dept: CARDIAC REHAB | Facility: HOSPITAL | Age: 57
End: 2017-06-23
Payer: COMMERCIAL

## 2017-06-23 ENCOUNTER — APPOINTMENT (OUTPATIENT)
Dept: CARDIAC REHAB | Facility: HOSPITAL | Age: 57
End: 2017-06-23
Attending: INTERNAL MEDICINE
Payer: COMMERCIAL

## 2017-06-23 PROCEDURE — 93798 PHYS/QHP OP CAR RHAB W/ECG: CPT

## 2017-06-26 ENCOUNTER — APPOINTMENT (OUTPATIENT)
Dept: CARDIAC REHAB | Facility: HOSPITAL | Age: 57
End: 2017-06-26
Payer: COMMERCIAL

## 2017-06-26 ENCOUNTER — CARDPULM VISIT (OUTPATIENT)
Dept: CARDIAC REHAB | Facility: HOSPITAL | Age: 57
End: 2017-06-26
Attending: INTERNAL MEDICINE
Payer: COMMERCIAL

## 2017-06-26 PROCEDURE — 93798 PHYS/QHP OP CAR RHAB W/ECG: CPT

## 2017-06-28 ENCOUNTER — APPOINTMENT (OUTPATIENT)
Dept: CARDIAC REHAB | Facility: HOSPITAL | Age: 57
End: 2017-06-28
Payer: COMMERCIAL

## 2017-06-28 ENCOUNTER — CARDPULM VISIT (OUTPATIENT)
Dept: CARDIAC REHAB | Facility: HOSPITAL | Age: 57
End: 2017-06-28
Attending: INTERNAL MEDICINE
Payer: COMMERCIAL

## 2017-06-28 PROCEDURE — 93798 PHYS/QHP OP CAR RHAB W/ECG: CPT

## 2017-06-30 ENCOUNTER — APPOINTMENT (OUTPATIENT)
Dept: CARDIAC REHAB | Facility: HOSPITAL | Age: 57
End: 2017-06-30
Attending: INTERNAL MEDICINE
Payer: COMMERCIAL

## 2017-06-30 ENCOUNTER — APPOINTMENT (OUTPATIENT)
Dept: CARDIAC REHAB | Facility: HOSPITAL | Age: 57
End: 2017-06-30
Payer: COMMERCIAL

## 2017-07-03 ENCOUNTER — APPOINTMENT (OUTPATIENT)
Dept: CARDIAC REHAB | Facility: HOSPITAL | Age: 57
End: 2017-07-03
Attending: INTERNAL MEDICINE
Payer: COMMERCIAL

## 2017-07-03 ENCOUNTER — APPOINTMENT (OUTPATIENT)
Dept: CARDIAC REHAB | Facility: HOSPITAL | Age: 57
End: 2017-07-03
Payer: COMMERCIAL

## 2017-07-11 ENCOUNTER — TELEPHONE (OUTPATIENT)
Dept: MEDSURG UNIT | Facility: HOSPITAL | Age: 57
End: 2017-07-11

## 2017-07-11 DIAGNOSIS — R07.89 CHEST DISCOMFORT: Primary | ICD-10-CM

## 2017-07-12 ENCOUNTER — TELEPHONE (OUTPATIENT)
Dept: CARDIOLOGY CLINIC | Facility: CLINIC | Age: 57
End: 2017-07-12

## 2017-07-18 ENCOUNTER — HOSPITAL ENCOUNTER (OUTPATIENT)
Dept: CV DIAGNOSTICS | Facility: HOSPITAL | Age: 57
Discharge: HOME | End: 2017-07-18
Attending: INTERNAL MEDICINE
Payer: COMMERCIAL

## 2017-07-18 DIAGNOSIS — R07.89 CHEST DISCOMFORT: ICD-10-CM

## 2017-07-18 PROCEDURE — 93350 STRESS TTE ONLY: CPT | Performed by: INTERNAL MEDICINE

## 2017-07-18 PROCEDURE — 93016 CV STRESS TEST SUPVJ ONLY: CPT | Performed by: INTERNAL MEDICINE

## 2017-07-18 PROCEDURE — 93018 CV STRESS TEST I&R ONLY: CPT | Performed by: INTERNAL MEDICINE

## 2017-07-18 PROCEDURE — 93017 CV STRESS TEST TRACING ONLY: CPT | Performed by: INTERNAL MEDICINE

## 2017-09-26 PROBLEM — M25.521 RIGHT ELBOW PAIN: Status: ACTIVE | Noted: 2017-09-26

## 2017-09-26 PROBLEM — S42.441A CLOSED DISPLACED AVULSION FRACTURE OF MEDIAL EPICONDYLE OF RIGHT HUMERUS: Status: ACTIVE | Noted: 2017-09-26

## 2017-10-04 ENCOUNTER — TELEPHONE (OUTPATIENT)
Dept: CARDIOLOGY CLINIC | Facility: CLINIC | Age: 57
End: 2017-10-04

## 2017-10-04 NOTE — TELEPHONE ENCOUNTER
Jamel Tran,    Pt has Right elbow repair surgery on 10/6, and asking if he needs to hold aspirin 81 mg prior. Please advise.

## 2017-10-05 NOTE — OR NURSING
Pt. States his cardiologist instructed him to remain on Aspirin 81 mg. Daily. Pt. Instructed to notify Dr. Vi Rondon that he has not stopped his daily aspirin.

## 2017-10-06 ENCOUNTER — SURGERY (OUTPATIENT)
Age: 57
End: 2017-10-06

## 2017-10-06 ENCOUNTER — ANESTHESIA (OUTPATIENT)
Dept: SURGERY | Facility: HOSPITAL | Age: 57
End: 2017-10-06
Payer: COMMERCIAL

## 2017-10-06 ENCOUNTER — HOSPITAL ENCOUNTER (OUTPATIENT)
Facility: HOSPITAL | Age: 57
Setting detail: HOSPITAL OUTPATIENT SURGERY
Discharge: HOME | End: 2017-10-06
Attending: ORTHOPAEDIC SURGERY | Admitting: ORTHOPAEDIC SURGERY
Payer: COMMERCIAL

## 2017-10-06 ENCOUNTER — ANESTHESIA EVENT (OUTPATIENT)
Dept: SURGERY | Facility: HOSPITAL | Age: 57
End: 2017-10-06
Payer: COMMERCIAL

## 2017-10-06 VITALS
DIASTOLIC BLOOD PRESSURE: 76 MMHG | HEIGHT: 64.25 IN | TEMPERATURE: 98 F | RESPIRATION RATE: 16 BRPM | OXYGEN SATURATION: 94 % | BODY MASS INDEX: 30.25 KG/M2 | HEART RATE: 52 BPM | WEIGHT: 177.19 LBS | SYSTOLIC BLOOD PRESSURE: 125 MMHG

## 2017-10-06 DIAGNOSIS — S42.441K CLOSED DISPLACED AVULSION FRACTURE OF MEDIAL EPICONDYLE OF RIGHT HUMERUS WITH NONUNION, SUBSEQUENT ENCOUNTER: Primary | ICD-10-CM

## 2017-10-06 PROCEDURE — 3E0T3BZ INTRODUCTION OF ANESTHETIC AGENT INTO PERIPHERAL NERVES AND PLEXI, PERCUTANEOUS APPROACH: ICD-10-PCS | Performed by: ANESTHESIOLOGY

## 2017-10-06 PROCEDURE — 99152 MOD SED SAME PHYS/QHP 5/>YRS: CPT | Performed by: ORTHOPAEDIC SURGERY

## 2017-10-06 PROCEDURE — 0PSF04Z REPOSITION RIGHT HUMERAL SHAFT WITH INTERNAL FIXATION DEVICE, OPEN APPROACH: ICD-10-PCS | Performed by: ORTHOPAEDIC SURGERY

## 2017-10-06 PROCEDURE — 64415 NJX AA&/STRD BRCH PLXS IMG: CPT | Performed by: ORTHOPAEDIC SURGERY

## 2017-10-06 PROCEDURE — 01N40ZZ RELEASE ULNAR NERVE, OPEN APPROACH: ICD-10-PCS | Performed by: ORTHOPAEDIC SURGERY

## 2017-10-06 PROCEDURE — 76942 ECHO GUIDE FOR BIOPSY: CPT | Performed by: ORTHOPAEDIC SURGERY

## 2017-10-06 DEVICE — IMPLANTABLE DEVICE: Type: IMPLANTABLE DEVICE | Site: ARM | Status: FUNCTIONAL

## 2017-10-06 RX ORDER — MIDAZOLAM HYDROCHLORIDE 1 MG/ML
INJECTION INTRAMUSCULAR; INTRAVENOUS AS NEEDED
Status: DISCONTINUED | OUTPATIENT
Start: 2017-10-06 | End: 2017-10-06 | Stop reason: SURG

## 2017-10-06 RX ORDER — CEPHALEXIN 500 MG/1
500 CAPSULE ORAL 4 TIMES DAILY
Qty: 12 CAPSULE | Refills: 0 | Status: SHIPPED | OUTPATIENT
Start: 2017-10-06 | End: 2017-10-09

## 2017-10-06 RX ORDER — HYDROMORPHONE HYDROCHLORIDE 1 MG/ML
0.2 INJECTION, SOLUTION INTRAMUSCULAR; INTRAVENOUS; SUBCUTANEOUS EVERY 5 MIN PRN
Status: DISCONTINUED | OUTPATIENT
Start: 2017-10-06 | End: 2017-10-06

## 2017-10-06 RX ORDER — HYDROCODONE BITARTRATE AND ACETAMINOPHEN 7.5; 325 MG/1; MG/1
1 TABLET ORAL EVERY 6 HOURS PRN
Status: DISCONTINUED | OUTPATIENT
Start: 2017-10-06 | End: 2017-10-06

## 2017-10-06 RX ORDER — HYDROCODONE BITARTRATE AND ACETAMINOPHEN 5; 325 MG/1; MG/1
2 TABLET ORAL AS NEEDED
Status: DISCONTINUED | OUTPATIENT
Start: 2017-10-06 | End: 2017-10-06

## 2017-10-06 RX ORDER — HYDROCODONE BITARTRATE AND ACETAMINOPHEN 5; 325 MG/1; MG/1
1 TABLET ORAL AS NEEDED
Status: DISCONTINUED | OUTPATIENT
Start: 2017-10-06 | End: 2017-10-06

## 2017-10-06 RX ORDER — LIDOCAINE HYDROCHLORIDE 10 MG/ML
INJECTION, SOLUTION EPIDURAL; INFILTRATION; INTRACAUDAL; PERINEURAL AS NEEDED
Status: DISCONTINUED | OUTPATIENT
Start: 2017-10-06 | End: 2017-10-06 | Stop reason: SURG

## 2017-10-06 RX ORDER — DEXAMETHASONE SODIUM PHOSPHATE 10 MG/ML
INJECTION, SOLUTION INTRAMUSCULAR; INTRAVENOUS AS NEEDED
Status: DISCONTINUED | OUTPATIENT
Start: 2017-10-06 | End: 2017-10-06 | Stop reason: SURG

## 2017-10-06 RX ORDER — HYDROMORPHONE HYDROCHLORIDE 1 MG/ML
0.6 INJECTION, SOLUTION INTRAMUSCULAR; INTRAVENOUS; SUBCUTANEOUS EVERY 5 MIN PRN
Status: DISCONTINUED | OUTPATIENT
Start: 2017-10-06 | End: 2017-10-06

## 2017-10-06 RX ORDER — SODIUM CHLORIDE, SODIUM LACTATE, POTASSIUM CHLORIDE, CALCIUM CHLORIDE 600; 310; 30; 20 MG/100ML; MG/100ML; MG/100ML; MG/100ML
INJECTION, SOLUTION INTRAVENOUS CONTINUOUS
Status: DISCONTINUED | OUTPATIENT
Start: 2017-10-06 | End: 2017-10-06

## 2017-10-06 RX ORDER — METOCLOPRAMIDE 10 MG/1
10 TABLET ORAL ONCE
Status: COMPLETED | OUTPATIENT
Start: 2017-10-06 | End: 2017-10-06

## 2017-10-06 RX ORDER — ONDANSETRON 2 MG/ML
INJECTION INTRAMUSCULAR; INTRAVENOUS AS NEEDED
Status: DISCONTINUED | OUTPATIENT
Start: 2017-10-06 | End: 2017-10-06 | Stop reason: SURG

## 2017-10-06 RX ORDER — MORPHINE SULFATE 4 MG/ML
4 INJECTION, SOLUTION INTRAMUSCULAR; INTRAVENOUS EVERY 10 MIN PRN
Status: DISCONTINUED | OUTPATIENT
Start: 2017-10-06 | End: 2017-10-06

## 2017-10-06 RX ORDER — HYDROCODONE BITARTRATE AND ACETAMINOPHEN 7.5; 325 MG/1; MG/1
1-2 TABLET ORAL EVERY 6 HOURS PRN
Qty: 40 TABLET | Refills: 0 | Status: SHIPPED | OUTPATIENT
Start: 2017-10-06 | End: 2017-10-16

## 2017-10-06 RX ORDER — EPHEDRINE SULFATE 50 MG/ML
INJECTION, SOLUTION INTRAVENOUS AS NEEDED
Status: DISCONTINUED | OUTPATIENT
Start: 2017-10-06 | End: 2017-10-06 | Stop reason: SURG

## 2017-10-06 RX ORDER — FAMOTIDINE 20 MG/1
20 TABLET ORAL ONCE
Status: COMPLETED | OUTPATIENT
Start: 2017-10-06 | End: 2017-10-06

## 2017-10-06 RX ORDER — DEXAMETHASONE SODIUM PHOSPHATE 4 MG/ML
VIAL (ML) INJECTION AS NEEDED
Status: DISCONTINUED | OUTPATIENT
Start: 2017-10-06 | End: 2017-10-06 | Stop reason: SURG

## 2017-10-06 RX ORDER — MORPHINE SULFATE 2 MG/ML
2 INJECTION, SOLUTION INTRAMUSCULAR; INTRAVENOUS EVERY 10 MIN PRN
Status: DISCONTINUED | OUTPATIENT
Start: 2017-10-06 | End: 2017-10-06

## 2017-10-06 RX ORDER — NALOXONE HYDROCHLORIDE 0.4 MG/ML
80 INJECTION, SOLUTION INTRAMUSCULAR; INTRAVENOUS; SUBCUTANEOUS AS NEEDED
Status: DISCONTINUED | OUTPATIENT
Start: 2017-10-06 | End: 2017-10-06

## 2017-10-06 RX ORDER — ONDANSETRON 2 MG/ML
4 INJECTION INTRAMUSCULAR; INTRAVENOUS ONCE AS NEEDED
Status: DISCONTINUED | OUTPATIENT
Start: 2017-10-06 | End: 2017-10-06

## 2017-10-06 RX ORDER — ROPIVACAINE HYDROCHLORIDE 5 MG/ML
INJECTION, SOLUTION EPIDURAL; INFILTRATION; PERINEURAL AS NEEDED
Status: DISCONTINUED | OUTPATIENT
Start: 2017-10-06 | End: 2017-10-06 | Stop reason: SURG

## 2017-10-06 RX ORDER — MORPHINE SULFATE 10 MG/ML
6 INJECTION, SOLUTION INTRAMUSCULAR; INTRAVENOUS EVERY 10 MIN PRN
Status: DISCONTINUED | OUTPATIENT
Start: 2017-10-06 | End: 2017-10-06

## 2017-10-06 RX ORDER — HYDROCODONE BITARTRATE AND ACETAMINOPHEN 5; 325 MG/1; MG/1
1-2 TABLET ORAL EVERY 4 HOURS PRN
Qty: 40 TABLET | Refills: 1 | Status: SHIPPED | OUTPATIENT
Start: 2017-10-06 | End: 2017-10-06

## 2017-10-06 RX ORDER — HYDROMORPHONE HYDROCHLORIDE 1 MG/ML
0.4 INJECTION, SOLUTION INTRAMUSCULAR; INTRAVENOUS; SUBCUTANEOUS EVERY 5 MIN PRN
Status: DISCONTINUED | OUTPATIENT
Start: 2017-10-06 | End: 2017-10-06

## 2017-10-06 RX ORDER — METOPROLOL TARTRATE 5 MG/5ML
2.5 INJECTION INTRAVENOUS ONCE
Status: DISCONTINUED | OUTPATIENT
Start: 2017-10-06 | End: 2017-10-06

## 2017-10-06 RX ORDER — ACETAMINOPHEN 325 MG/1
650 TABLET ORAL ONCE
Status: COMPLETED | OUTPATIENT
Start: 2017-10-06 | End: 2017-10-06

## 2017-10-06 RX ORDER — HALOPERIDOL 5 MG/ML
0.25 INJECTION INTRAMUSCULAR ONCE AS NEEDED
Status: DISCONTINUED | OUTPATIENT
Start: 2017-10-06 | End: 2017-10-06

## 2017-10-06 RX ADMIN — EPHEDRINE SULFATE 10 MG: 50 INJECTION, SOLUTION INTRAVENOUS at 14:55:00

## 2017-10-06 RX ADMIN — MIDAZOLAM HYDROCHLORIDE 2 MG: 1 INJECTION INTRAMUSCULAR; INTRAVENOUS at 14:42:00

## 2017-10-06 RX ADMIN — LIDOCAINE HYDROCHLORIDE 50 MG: 10 INJECTION, SOLUTION EPIDURAL; INFILTRATION; INTRACAUDAL; PERINEURAL at 14:51:00

## 2017-10-06 RX ADMIN — SODIUM CHLORIDE, SODIUM LACTATE, POTASSIUM CHLORIDE, CALCIUM CHLORIDE: 600; 310; 30; 20 INJECTION, SOLUTION INTRAVENOUS at 16:12:00

## 2017-10-06 RX ADMIN — DEXAMETHASONE SODIUM PHOSPHATE 4 MG: 4 MG/ML VIAL (ML) INJECTION at 14:57:00

## 2017-10-06 RX ADMIN — SODIUM CHLORIDE, SODIUM LACTATE, POTASSIUM CHLORIDE, CALCIUM CHLORIDE: 600; 310; 30; 20 INJECTION, SOLUTION INTRAVENOUS at 14:48:00

## 2017-10-06 RX ADMIN — LIDOCAINE HYDROCHLORIDE 1 ML: 10 INJECTION, SOLUTION EPIDURAL; INFILTRATION; INTRACAUDAL; PERINEURAL at 14:42:00

## 2017-10-06 RX ADMIN — ROPIVACAINE HYDROCHLORIDE 30 ML: 5 INJECTION, SOLUTION EPIDURAL; INFILTRATION; PERINEURAL at 14:42:00

## 2017-10-06 RX ADMIN — DEXAMETHASONE SODIUM PHOSPHATE 4 MG: 10 INJECTION, SOLUTION INTRAMUSCULAR; INTRAVENOUS at 14:42:00

## 2017-10-06 RX ADMIN — ONDANSETRON 4 MG: 2 INJECTION INTRAMUSCULAR; INTRAVENOUS at 14:57:00

## 2017-10-06 RX ADMIN — SODIUM CHLORIDE, SODIUM LACTATE, POTASSIUM CHLORIDE, CALCIUM CHLORIDE: 600; 310; 30; 20 INJECTION, SOLUTION INTRAVENOUS at 16:59:00

## 2017-10-06 NOTE — ANESTHESIA POSTPROCEDURE EVALUATION
Patient: Jabari Gomez    Procedure Summary     Date:  10/06/17 Room / Location:  [address] [place] [address] MAIN OR 12 / [address] [place] [address] MAIN OR    Anesthesia Start:  9323 Anesthesia Stop:  1700    Procedure:  ELBOW OPEN REDUCTION INTERNAL FIXATION (Right ) Diagnosis:  (Right elbow fr

## 2017-10-06 NOTE — ANESTHESIA PREPROCEDURE EVALUATION
Anesthesia PreOp Note    HPI:     Odalys Szymanski is a 60year old male who presents for preoperative consultation requested by:  Natali Ordonez MD    Date of Surgery: 10/6/2017    Procedure(s):  ELBOW OPEN REDUCTION INTERNAL FIXATION  Indication: Righ 10/6/2017 at 0430   Atorvastatin Calcium 40 MG Oral Tab Take 40 mg by mouth nightly. Disp:  Rfl:  10/5/2017 at Unknown time   psyllium 28 % Oral Powd Pack Take 1 packet by mouth daily.  Disp:  Rfl:  10/5/2017 at Unknown time       Current Facility-Administe Exam     Patient summary reviewed and Nursing notes reviewed    Airway   Mallampati: III  TM distance: >3 FB  Neck ROM: full  Dental      Pulmonary - negative ROS and normal exam   Cardiovascular - normal exam  (+) hypertension, CAD,     Neuro/Psych - nega

## 2017-10-06 NOTE — INTERVAL H&P NOTE
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

## 2017-10-06 NOTE — BRIEF OP NOTE
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

## 2017-10-06 NOTE — DISCHARGE SUMMARY
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

## 2017-10-06 NOTE — ANESTHESIA PROCEDURE NOTES
Peripheral Block    Anesthesiologist:  Kenia Berry  Performed by:   Anesthesiologist  Patient Location:  PACU  Start Time:  10/6/2017 2:40 PM  End Time:  10/6/2017 2:42 PM  Site Identification: nerve stimulator and IMAGE STORED AND RETRIEVABLE    Reason f

## 2017-10-13 NOTE — OPERATIVE REPORT
[facility]    PATIENT'S NAME: Agnieszka Mimir   ATTENDING PHYSICIAN: Carlos Morales MD   OPERATING PHYSICIAN: Carlos Morales MD   PATIENT ACCOUNT#:   [id number]    LOCATION:  [address]  MEDICAL RECORD #:   [id number]       DATE Elana Lucas dissect the ulnar nerve free proximally. The arcade of [place] was released. Next, the fascia overlying the cubital tunnel was released. Mata's ligament was divided. Dissection was utilized to explore the ulnar nerve distally.   The fibrous bands a insertion of the ulnar collateral ligament and the flexor pronator tendon origin. The nonunion was then stabilized with 3.0 mm cannulated screws from the [employer] stainless steel set. The screws were inserted with a washer. Three screws were utilized.   Jose J Caicedo

## 2017-11-14 PROBLEM — M17.12 PRIMARY OSTEOARTHRITIS OF LEFT KNEE: Status: ACTIVE | Noted: 2017-11-14

## 2017-11-14 PROBLEM — M25.562 ACUTE PAIN OF LEFT KNEE: Status: ACTIVE | Noted: 2017-11-14

## 2017-11-14 PROBLEM — M23.92 ACUTE INTERNAL DERANGEMENT OF LEFT KNEE: Status: ACTIVE | Noted: 2017-11-14

## 2017-11-22 PROBLEM — S83.242A ACUTE MEDIAL MENISCUS TEAR OF LEFT KNEE: Status: ACTIVE | Noted: 2017-11-22

## 2017-12-11 ENCOUNTER — OFFICE VISIT (OUTPATIENT)
Dept: CARDIOLOGY CLINIC | Facility: CLINIC | Age: 57
End: 2017-12-11

## 2017-12-11 VITALS
DIASTOLIC BLOOD PRESSURE: 80 MMHG | HEART RATE: 56 BPM | HEIGHT: 64 IN | BODY MASS INDEX: 31.07 KG/M2 | SYSTOLIC BLOOD PRESSURE: 132 MMHG | WEIGHT: 182 LBS | RESPIRATION RATE: 20 BRPM

## 2017-12-11 DIAGNOSIS — Z95.1 S/P CABG X 2: ICD-10-CM

## 2017-12-11 DIAGNOSIS — I25.10 CAD, MULTIPLE VESSEL: Primary | ICD-10-CM

## 2017-12-11 PROBLEM — E78.00 PURE HYPERCHOLESTEROLEMIA: Status: ACTIVE | Noted: 2017-12-11

## 2017-12-11 PROCEDURE — 99214 OFFICE O/P EST MOD 30 MIN: CPT | Performed by: INTERNAL MEDICINE

## 2017-12-11 PROCEDURE — 99212 OFFICE O/P EST SF 10 MIN: CPT | Performed by: INTERNAL MEDICINE

## 2017-12-11 NOTE — PROGRESS NOTES
Khushboo Davis is a 59year old male. Patient presents with:   Follow - Up    HPI:   Patient is about 9 months post acute MI intervention he is doing well he is not having any chest discomfort or dyspnea he broke his bone in his elbow and had to have scott developed, well nourished,in no apparent distress  SKIN: no rashes,no suspicious lesions  HEENT: atraumatic, normocephalic,ears and throat are clear  NECK: supple,no adenopathy,no bruits  LUNGS: clear to auscultation  CARDIO: regular rate and rhythm  GI: g

## 2018-06-18 ENCOUNTER — OFFICE VISIT (OUTPATIENT)
Dept: CARDIOLOGY CLINIC | Facility: CLINIC | Age: 58
End: 2018-06-18

## 2018-06-18 VITALS
BODY MASS INDEX: 31 KG/M2 | WEIGHT: 182 LBS | SYSTOLIC BLOOD PRESSURE: 104 MMHG | RESPIRATION RATE: 12 BRPM | HEART RATE: 58 BPM | DIASTOLIC BLOOD PRESSURE: 70 MMHG

## 2018-06-18 DIAGNOSIS — E78.00 PURE HYPERCHOLESTEROLEMIA: ICD-10-CM

## 2018-06-18 DIAGNOSIS — I25.10 CAD, MULTIPLE VESSEL: Primary | ICD-10-CM

## 2018-06-18 PROCEDURE — 99212 OFFICE O/P EST SF 10 MIN: CPT | Performed by: INTERNAL MEDICINE

## 2018-06-18 PROCEDURE — 99214 OFFICE O/P EST MOD 30 MIN: CPT | Performed by: INTERNAL MEDICINE

## 2018-06-18 NOTE — PROGRESS NOTES
Bam Solorzano is a 59year old male. Patient presents with: Follow - Up: Both hands and feet swell during the day    HPI:   Is feeling well. Is not really having much chest discomfort.   He still plays hockey 2 or 3 times a week generally feeling good Pulse 58   Resp 12   Wt 182 lb (82.6 kg)   BMI 31.24 kg/m²   Physical Exam   Constitutional: He is oriented to person, place, and time. He appears well-developed and well-nourished.    Cardiovascular: Normal rate, regular rhythm, normal heart sounds and in

## 2018-07-02 NOTE — BRIEF PROCEDURE NOTE
History of Present Illness
 
General
Chief Complaint: General Adult
Stated Complaint: NON-COMPLIANCE WITH TX
Source: patient, old records, W10
Exam Limitations: no limitations
 
Vital Signs & Intake/Output
Vital Signs & Intake/Output
 Vital Signs
 
 
Date Time Temp Pulse Resp B/P B/P Pulse O2 O2 Flow FiO2
 
     Mean Ox Delivery Rate 
 
07/02 2336       Room Air  
 
07/02 2210 97.5 89 18 99/69  97 Room Air  
 
07/02 1801       Room Air  
 
07/02 1721 97.7 96 18 114/65  95 Room Air  
 
 
 ED Intake and Output
 
 
 07/03 0000 07/02 1200
 
Intake Total 240 
 
Output Total  
 
Balance 240 
 
   
 
Intake, Oral 240 
 
 
 
Allergies
Coded Allergies:
Iodinated Contrast- Oral and IV Dye (IODINATED CONTRAST MEDIA - IV DYE) (RASH 07
/02/18)
iodine (RASH 07/02/18)
  IODINE-RASH PER ANTIBIOTIC ORDER SHEET OF 7/2/18    (SJS)
 
Reconcile Medications
Aspirin (Ecotrin*) 81 MG TABLET.   81 MG PO DAILY Heart
Atorvastatin Calcium 40 MG TABLET   40 MG PO 1700 Cholesterol
Cefazolin Sodium 1 GRAM VIAL   1,000 MG IV SEE ADMIN CRITERIA osteomyelitis
Omeprazole 20 MG CAPSULE.DR   1 CAP PO DAILY GI  (Reported)
Oxycodone HCl/Acetaminophen (Percocet 5-325 MG Tablet) 5 MG-325 MG TABLET   2 
TAB PO Q6P PRN PAIN SCALE 7-10 (SEVERE)
 
Triage Note:
PT BIBA FROM F WITH C/O NON-COMPLIANCE WITH
 CARE. PER EMS AND PT, PT REFUSED HEMODIALYSIS THIS
 AM AND WAS SUPPOSED TO COME TO ED. PT REFUSED TO
 COME TO ED AT THAT TIME. PT REMAINED NON-COMPLIANT
 WITH CARE AT [facility] FOR THE REMAINDER OF THE DAY. PT
 WAS THEN INFORMED THAT DR WHITE WANTED HIM TO
 COME TO HOSPITAL TODAY FOR PRE-OP FOR SKIN GRAFT
 TOMORROW. ON ARRIVAL PT STTAED THAT HE DOES NOT
 WANT TO HAVE SKIN GRAFT AND IS HERE SOLELY BECAUSE
 HE DOES NOT WANT TO BE AT [facility]. PT STATES
 HE WAS TOLD THAT WHEN HE PROVED THAT HE COULD WALK
 SAFELY AT HOME, HE WOULD BE DC'd. NOW THAT THE
 TIME IS GETTING CLOSER, DR WHITE WANTS TO DO
 GRAFT WHICH WILL PROLONG HIS TIME IN ECF. ON
 ARRIVAL, PT STATES HE WILL REFUSE ALL TX HERE AS
 WELL
Triage Nurses Notes Reviewed? yes
Onset: Abrupt
Duration: day(s):
Timing: recent history
Injury Environment: home
HPI:
48-year-old male comes into the emergency room sent in by nursing facility [facility] for refusing care.  Patient has chronic kidney disease and is on dialysis.
 He has a chronic wound on his left lower leg that has a wound VAC.  He was 
refusing to go to dialysis today and refusing care.  His wound.  He is alert and
oriented and mentally competent and able to make his own medical decisions.  
Aristides Patelcelina sent him here for further evaluation.  He denies any fever chills 
chest pain shortness of breath lightheaded dizziness.  He goes to dialysis 
Monday Tuesday Wednesdays.
([place] Bliss)
 
Past History
 
Travel History
Traveled to [place] past 21 day No
 
Medical History
Any Pertinent Medical History? see below for history
Neurological: migraine
EENT: NONE
Cardiovascular: hypertension
Respiratory: collapsed left lung with a persistent left pleural effusion.
Gastrointestinal: GERD, GI BLEED
Hepatic: NONE
Renal: ESRD on HD, DIALYSIS M-W-F ACCESS L LEG
Musculoskeletal: gout, osteoarthritis, secondary OSTEOPOROSIS GANGREEN L 2ND 
FINGER
Psychiatric: NONE
Endocrine: hyperparathyroidism
Blood Disorders: NONE
Cancer(s): NONE
GYN/Reproductive: NONE
Other Medical Hx:
In 2004 he had an infected abscess with MSSA bacteremia.
Complete central venous occlusion in 2007.
History of MRSA: No
History of VRE: No
History of CDIFF: No
 
Surgical History
Surgical History: GRAFT, LEFT THIGH s/p placement of numerous August catheters/also
dual-lumen Amor's left radial cephalic primary AVF on 03/28/2000 left upper 
arm AVG Tenckhoff catheter placement and subsequent removal status post 
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
 
Psychosocial History
Who do you live with Family
Services at Home Nursing
What is your primary language English
Tobacco Use: Never used
ETOH Use: denies use
 
Family History
Family History, If Any:
MOTHER (Heart Dz, Breast Ca).
FATHER (Diabetes).
Relation not specified for:
  FH: stroke
 
Hx Contributory? No
([place] Bliss)
 
Review of Systems
 
Review of Systems
Constitutional:
Reports: no symptoms. 
EENTM:
Reports: no symptoms. 
Respiratory:
Reports: no symptoms. 
Cardiovascular:
Reports: no symptoms. 
GI:
Reports: no symptoms. 
Genitourinary:
Reports: no symptoms. 
Musculoskeletal:
Reports: no symptoms. 
Skin:
Reports: see HPI. 
Neurological/Psychological:
Reports: no symptoms. 
Hematologic/Endocrine:
Reports: no symptoms. 
Immunologic/Allergic:
Reports: no symptoms. 
All Other Systems: Reviewed and Negative
([place] Bliss)
 
Physical Exam
 
Physical Exam
General Appearance: no apparent distress, alert, awake
Head: atraumatic
Eyes:
Bilateral: normal appearance. 
Ears, Nose, Throat: normal ENT inspection, hearing grossly normal
Neck: normal inspection
Respiratory: normal breath sounds, no respiratory distress
Gastrointestinal: soft
Extremities: WOUND DRESSING LEFT FOOT
Neurologic/Psych: awake, alert, oriented x 3, normal mood/affect
Skin: intact
 
Core Measures
ACS in differential dx? No
CVA/TIA Diagnosis: No
Sepsis Present: No
Sepsis Focused Exam Completed? No
([place] Bliss)
 
Progress
Differential Diagnoses
I considered the following diagnoses in my evaluation of the patient:  Chronic 
kidney disease, malingering, osteomyelitis, cellulitis,FLUID OVERLOAD
 
Plan of Care:
7/3/2018 12:38:39 AM
 
Patient clinically looks well.  Patient is in no apparent distress.  Patient 
clinically looks well.  He is able to make his own medical decisions.  patient 
was here for a long duration here in the emergency room.  Case management was 
involved.  Initially the patient was going to sign out AGAINST MEDICAL ADVICE.  
We are waiting on the speak with Dr. White who is his wound care doctor but 
she was in the operating room.  I spoke with him and he be willing to sign off 
for wound care for the patient.  Patient was reevaluated and reports that now he
[facility].  He still does not feel he needs dialysis.  
Clinically he does not appear to be fluid overload.  He has no respiratory 
symptoms.  He clinically looks well.  I feel that patient is stable to follow-up
for dialysis tomorrow does not require acute immediate dialysis tonight.  He 
reports she has missed today's in the past.  Case is discussed with Dr. Dejesus.  Patient is in agreement to plan of care.
Initial ED EKG: none
([place] Bliss)
 
Departure
 
Departure
Disposition: HOME OR SELF CARE
Condition: Stable
Clinical Impression
Primary Impression: Noncompliance by refusing service
Secondary Impressions: CKD (chronic kidney disease)
Referrals:
Patient Has No Primary Care Dr (PCP/Family)
 
Departure Forms:
Customer Survey
General Discharge Information
([place] Bliss)
 
PA/NP Co-Sign Statement
Statement:
ED Attending supervision documentation-
 
[] I saw and evaluated the patient. I have also reviewed all the pertinent lab 
results and diagnostic results. I agree with the findings and the plan of care 
as documented in the PA's/NP's documentation. 
 
[X] I have reviewed the ED Record and agree with the PA's/NP's documentation.
 
[] Additions or exceptions (if any) to the PAs/NP's note and plan are 
summarized below:
[]
 
(Oleg BEDOLLA,Bryon CAMPBELL)
 
Critical Care Note
 
Critical Care Note
Critical Care Time: non-applicable
([place] Bliss) [facility]    Brief Cardiac Cath Note  Veleta Shoulders Patient Status:  Outpatient in a Bed    10/9/1960 MRN [id number]   Location [facility] Attending Alec Dalton MD   Hosp Day # 0 PCP No primary care

## 2018-10-05 NOTE — MR AVS SNAPSHOT
[facility] SPECIALTY [facility] - [place] [address] [address] [address]  [phone]               Thank you for choosing us for your health care visit with RADHA Brand.   We are glad to serve you and happy to provide you with this summary Commonly known as:  ZYRTEC           Clopidogrel Bisulfate 75 MG Tabs   Take 1 tablet (75 mg total) by mouth daily. Commonly known as:  PLAVIX           docusate sodium 100 MG Caps   Take 100 mg by mouth 2 (two) times daily.    Commonly known as:  [place] Don’t eat while when you’re bored.      EAT THESE FOODS MORE OFTEN: EAT THESE FOODS LESS OFTEN:   Make half your plate fruits and vegetables Highly refined, white starches including white bread, rice and pasta   Eat plenty of protein, keep the fat content l (0) independent

## 2018-11-28 ENCOUNTER — APPOINTMENT (OUTPATIENT)
Dept: GENERAL RADIOLOGY | Facility: HOSPITAL | Age: 58
End: 2018-11-28
Attending: EMERGENCY MEDICINE
Payer: COMMERCIAL

## 2018-11-28 ENCOUNTER — TELEPHONE (OUTPATIENT)
Dept: CARDIOLOGY CLINIC | Facility: CLINIC | Age: 58
End: 2018-11-28

## 2018-11-28 ENCOUNTER — APPOINTMENT (OUTPATIENT)
Dept: GENERAL RADIOLOGY | Facility: HOSPITAL | Age: 58
End: 2018-11-28
Payer: COMMERCIAL

## 2018-11-28 ENCOUNTER — HOSPITAL ENCOUNTER (EMERGENCY)
Facility: HOSPITAL | Age: 58
Discharge: HOME | End: 2018-11-28
Attending: EMERGENCY MEDICINE
Payer: COMMERCIAL

## 2018-11-28 VITALS
TEMPERATURE: 98 F | OXYGEN SATURATION: 96 % | HEART RATE: 42 BPM | RESPIRATION RATE: 15 BRPM | SYSTOLIC BLOOD PRESSURE: 121 MMHG | HEIGHT: 64 IN | BODY MASS INDEX: 30.73 KG/M2 | WEIGHT: 180 LBS | DIASTOLIC BLOOD PRESSURE: 78 MMHG

## 2018-11-28 DIAGNOSIS — R42 LIGHTHEADEDNESS: Primary | ICD-10-CM

## 2018-11-28 PROCEDURE — 85025 COMPLETE CBC W/AUTO DIFF WBC: CPT

## 2018-11-28 PROCEDURE — 71046 X-RAY EXAM CHEST 2 VIEWS: CPT | Performed by: EMERGENCY MEDICINE

## 2018-11-28 PROCEDURE — 84484 ASSAY OF TROPONIN QUANT: CPT

## 2018-11-28 PROCEDURE — 80048 BASIC METABOLIC PNL TOTAL CA: CPT | Performed by: EMERGENCY MEDICINE

## 2018-11-28 PROCEDURE — 36415 COLL VENOUS BLD VENIPUNCTURE: CPT

## 2018-11-28 PROCEDURE — 99285 EMERGENCY DEPT VISIT HI MDM: CPT

## 2018-11-28 PROCEDURE — 80048 BASIC METABOLIC PNL TOTAL CA: CPT

## 2018-11-28 PROCEDURE — 85025 COMPLETE CBC W/AUTO DIFF WBC: CPT | Performed by: EMERGENCY MEDICINE

## 2018-11-28 PROCEDURE — 93005 ELECTROCARDIOGRAM TRACING: CPT

## 2018-11-28 PROCEDURE — 84484 ASSAY OF TROPONIN QUANT: CPT | Performed by: EMERGENCY MEDICINE

## 2018-11-28 PROCEDURE — 93010 ELECTROCARDIOGRAM REPORT: CPT | Performed by: INTERNAL MEDICINE

## 2018-11-28 NOTE — HISTORICAL OFFICE NOTE
Chelsey Marie  : 10/09/1960  ACCOUNT:  [id number]  [id number]  PCP:      TODAY'S DATE: 2017  DICTATED BY:  RADHA Jimenze]    CHIEF COMPLAINT: [Followup of Atherosclerotic Heart Disease Of Native Coronary Artery, Followup of MI, Acute and Fo negative. CV: see HPI; denies claudication. RESP: denies chronic cough, hemoptysis. GI: denies melena, hematochezia. : no hematuria. INTEG: no new rashes, lesions. MS: thigh tightness/pain. NEURO: lightheaded. HEM/LYMPH: denies easy bruising.  ALL: no new hematoma and a bruit. There is resolving ecchymosis seen. Distal pulses are present; however, due to his thigh symptoms and a bruit, I will get a STAT arterial Doppler to rule out a pseudoaneurysm. The patient will remain on aspirin and Brilinta.   He is 02/20/17 PriLOSEC OTC          20MG      1 daily                                  02/20/17 ZyrTEC Allergy        10MG      1 daily                                    RADHA Gongora/jaimee-Job ID: [id number]  D: 02/20/2017   T: 02/21/2017

## 2018-11-28 NOTE — ED PROVIDER NOTES
Patient Seen in: [facility] AND [facility] Emergency Department    History   Patient presents with:  Dizziness (neurologic)    Stated Complaint: dizziness after shoveling 2 days ago    HPI    23-year-old male with history of hypertension, hypercholesterolemia, e Types: 5 Cans of beer, 5 Shots of liquor per week    Drug use: No      Review of Systems    Positive for stated complaint: dizziness after shoveling 2 days ago  Other systems are as noted in HPI. Constitutional and vital signs reviewed.       All other s ------                     CBC W/ DIFFERENTIAL[[id number]]          Abnormal            Final result                 Please view results for these tests on the individual orders.    RAINBOW DRAW BLUE   RAINBOW DRAW LAVENDER   RAINBOW DRAW DARK GREEN   RAINB

## 2018-11-28 NOTE — TELEPHONE ENCOUNTER
Pt requesting to deena [address] directly, indicates he had an incident while shoveling snow and would like to discuss the event,pt said he feels much better today, put was light headed after shoveling, pls call at:[phone],thanks.

## 2018-11-28 NOTE — TELEPHONE ENCOUNTER
S/w Tracy Ellis, states 2 days ago he was shoveling snow and then busy doing \"things\" and then came inside and took boots off and felt lightheaded and dizzy, a bit lethargic, denies chest pains. Maybe slight SOB with shoveling.  Was sweaty from working outside

## 2018-11-28 NOTE — ED NOTES
Patient presents with lightheadedness/dizziness since shoveling snow a few days ago. Patient reports he was diaphoretic and felt a \"low pulse\" and felt faint. Hasn't felt normal since.  Brought his daughter to ED today and decided to get checked out as he

## 2018-11-28 NOTE — ED INITIAL ASSESSMENT (HPI)
Pt to ER with c/o increased dizziness x 2 days after shoveling snow. Pt states near syncope. Pt denies chest pain or sob. Pt hx of CABG. No respiratory distress noted. 100% on room air.

## 2018-11-29 NOTE — CONSULTS
[facility] - [facility]    Report of Cardiology Consultation    [address] [facility] Patient Status:  Emergency    10/9/1960 MRN [id number]   Location [address] Attending Heraclio Evans MD   Hosp Day # 0 PCP Lori Godoy, Fracture     scaphoid fx left wrist 1980   • Heart attack ([facility] [address] 02/13/2017   • High blood pressure    • High cholesterol    • Prediabetes    • Visual impairment        Past Surgical History  Past Surgical History:   Procedure Laterality Date   • CABG  03/2 Warm and dry. Results:     Laboratory Data:  Lab Results   Component Value Date    WBC 6.8 11/28/2018    HGB 13.6 11/28/2018    HCT 38.9 (L) 11/28/2018     11/28/2018    CREATSERUM 0.90 11/28/2018    BUN 9 11/28/2018     11/28/2018    K 4.

## 2018-11-29 NOTE — ED NOTES
Pt verbalized understanding of discharge and follow up instructions  Denies additional questioning  Stable upon leaving ed

## 2018-11-30 ENCOUNTER — OFFICE VISIT (OUTPATIENT)
Dept: CARDIOLOGY CLINIC | Facility: CLINIC | Age: 58
End: 2018-11-30
Payer: COMMERCIAL

## 2018-11-30 VITALS
WEIGHT: 180 LBS | HEART RATE: 50 BPM | HEIGHT: 64 IN | BODY MASS INDEX: 30.73 KG/M2 | SYSTOLIC BLOOD PRESSURE: 142 MMHG | RESPIRATION RATE: 16 BRPM | DIASTOLIC BLOOD PRESSURE: 82 MMHG | TEMPERATURE: 98 F

## 2018-11-30 DIAGNOSIS — I25.10 CAD, MULTIPLE VESSEL: Primary | ICD-10-CM

## 2018-11-30 PROCEDURE — 99212 OFFICE O/P EST SF 10 MIN: CPT | Performed by: NURSE PRACTITIONER

## 2018-11-30 PROCEDURE — 99214 OFFICE O/P EST MOD 30 MIN: CPT | Performed by: NURSE PRACTITIONER

## 2018-11-30 NOTE — PROGRESS NOTES
Nancy Ortiz is a 59year old male. Patient presents with:  Dizziness: While shoveling snow, chest and throat felt tight. Almost fainted. Has been fatiged ever since. HPI:   Patient comes in today for a checkup.   He has a history of bypass MarkellSigmascreeningkey Medical History:   Diagnosis Date   • BPH (benign prostatic hyperplasia)    • Coronary atherosclerosis    • Elbow fracture, right 1975   • Esophageal reflux    • Fracture     scaphoid fx left wrist 1980   • Heart attack ([facility] [address] 02/13/2017   • High blood pres did have a stress test last year that was completely normal.  I told him that he needs to stay hydrated and eat regular meals. The patient indicates understanding of these issues and agrees to the plan. The patient is asked to return in 1 month.       Mariposa

## 2018-11-30 NOTE — PATIENT INSTRUCTIONS
1, Eat and drink more fluids  2. Check BPs and pulses daily write down  3.  Call back on Monday afternoon with update of symptoms

## 2018-12-03 ENCOUNTER — TELEPHONE (OUTPATIENT)
Dept: CARDIOLOGY CLINIC | Facility: CLINIC | Age: 58
End: 2018-12-03

## 2018-12-03 NOTE — TELEPHONE ENCOUNTER
Pt states he would like to speak to jase calhoun in regards to office visit on Friday please call thank you

## 2019-01-01 ENCOUNTER — EXTERNAL RECORD (OUTPATIENT)
Dept: HEALTH INFORMATION MANAGEMENT | Facility: OTHER | Age: 59
End: 2019-01-01

## 2019-01-07 ENCOUNTER — OFFICE VISIT (OUTPATIENT)
Dept: CARDIOLOGY CLINIC | Facility: CLINIC | Age: 59
End: 2019-01-07
Payer: COMMERCIAL

## 2019-01-07 VITALS
HEART RATE: 54 BPM | DIASTOLIC BLOOD PRESSURE: 90 MMHG | SYSTOLIC BLOOD PRESSURE: 128 MMHG | BODY MASS INDEX: 31 KG/M2 | WEIGHT: 183 LBS

## 2019-01-07 DIAGNOSIS — I25.10 CAD, MULTIPLE VESSEL: Primary | ICD-10-CM

## 2019-01-07 PROCEDURE — 99214 OFFICE O/P EST MOD 30 MIN: CPT | Performed by: INTERNAL MEDICINE

## 2019-01-07 PROCEDURE — 99212 OFFICE O/P EST SF 10 MIN: CPT | Performed by: INTERNAL MEDICINE

## 2019-01-07 NOTE — PROGRESS NOTES
Judy Perdomo is a 59year old male. Patient presents with: Follow - Up: after November hospitalization. (6mo check up).  Dr. Tyson Yousif stopped Metoprolol since 11/28/18    HPI:   Dickdylan Gerberuro had an episode of lightheadedness in November when shoveling heavy sno All other systems reviewed and are negative.       EXAM:   /90 (BP Location: Right arm, Patient Position: Sitting, Cuff Size: adult)   Pulse 54   Wt 183 lb (83 kg)   BMI 31.41 kg/m²   Physical Exam   Constitutional: He is oriented to person, place,

## 2019-03-01 VITALS
BODY MASS INDEX: 29.37 KG/M2 | DIASTOLIC BLOOD PRESSURE: 74 MMHG | WEIGHT: 172 LBS | HEIGHT: 64 IN | SYSTOLIC BLOOD PRESSURE: 100 MMHG | HEART RATE: 60 BPM | OXYGEN SATURATION: 97 %

## 2019-03-11 ENCOUNTER — TELEPHONE (OUTPATIENT)
Dept: CARDIOLOGY CLINIC | Facility: CLINIC | Age: 59
End: 2019-03-11

## 2019-03-11 NOTE — TELEPHONE ENCOUNTER
Reviewed chart. S/w Mr. Carlos Flores, states he hasn't been feeling well for the last few weeks the today really felt bad and checked his BP: 180/100 this am. Later, getting ready for work he retook BP after shower 149/93 and he feels much better.  Has been o

## 2019-03-11 NOTE — TELEPHONE ENCOUNTER
Pt. States that his is not feeling well. Pt. States that This morning his BP was 149/93 and then it was 180/100 and he thinks that it may be high again right now.

## 2019-03-12 ENCOUNTER — APPOINTMENT (OUTPATIENT)
Dept: LAB | Age: 59
End: 2019-03-12
Attending: NURSE PRACTITIONER
Payer: COMMERCIAL

## 2019-03-12 ENCOUNTER — OFFICE VISIT (OUTPATIENT)
Dept: CARDIOLOGY CLINIC | Facility: CLINIC | Age: 59
End: 2019-03-12
Payer: COMMERCIAL

## 2019-03-12 VITALS — DIASTOLIC BLOOD PRESSURE: 88 MMHG | HEART RATE: 50 BPM | SYSTOLIC BLOOD PRESSURE: 142 MMHG

## 2019-03-12 DIAGNOSIS — R06.00 DYSPNEA, UNSPECIFIED TYPE: ICD-10-CM

## 2019-03-12 DIAGNOSIS — R00.2 PALPITATIONS: ICD-10-CM

## 2019-03-12 DIAGNOSIS — R00.2 PALPITATIONS: Primary | ICD-10-CM

## 2019-03-12 DIAGNOSIS — I25.10 CAD, MULTIPLE VESSEL: ICD-10-CM

## 2019-03-12 LAB
ALBUMIN SERPL-MCNC: 4 G/DL (ref 3.4–5)
ALBUMIN/GLOB SERPL: 1.1 {RATIO} (ref 1–2)
ALP LIVER SERPL-CCNC: 88 U/L (ref 45–117)
ALT SERPL-CCNC: 43 U/L (ref 16–61)
ANION GAP SERPL CALC-SCNC: 4 MMOL/L (ref 0–18)
AST SERPL-CCNC: 22 U/L (ref 15–37)
BILIRUB SERPL-MCNC: 0.5 MG/DL (ref 0.1–2)
BUN BLD-MCNC: 11 MG/DL (ref 7–18)
BUN/CREAT SERPL: 11.3 (ref 10–20)
CALCIUM BLD-MCNC: 9.2 MG/DL (ref 8.5–10.1)
CHLORIDE SERPL-SCNC: 109 MMOL/L (ref 98–107)
CO2 SERPL-SCNC: 29 MMOL/L (ref 21–32)
CREAT BLD-MCNC: 0.97 MG/DL (ref 0.7–1.3)
DEPRECATED RDW RBC AUTO: 41.1 FL (ref 35.1–46.3)
ERYTHROCYTE [DISTWIDTH] IN BLOOD BY AUTOMATED COUNT: 12.6 % (ref 11–15)
ERYTHROCYTE [SEDIMENTATION RATE] IN BLOOD: 10 MM/HR (ref 0–20)
GLOBULIN PLAS-MCNC: 3.6 G/DL (ref 2.8–4.4)
GLUCOSE BLD-MCNC: 94 MG/DL (ref 70–99)
HCT VFR BLD AUTO: 40.2 % (ref 39–53)
HGB BLD-MCNC: 13.6 G/DL (ref 13–17.5)
M PROTEIN MFR SERPL ELPH: 7.6 G/DL (ref 6.4–8.2)
MCH RBC QN AUTO: 30.4 PG (ref 26–34)
MCHC RBC AUTO-ENTMCNC: 33.8 G/DL (ref 31–37)
MCV RBC AUTO: 89.7 FL (ref 80–100)
OSMOLALITY SERPL CALC.SUM OF ELEC: 293 MOSM/KG (ref 275–295)
PLATELET # BLD AUTO: 291 10(3)UL (ref 150–450)
POTASSIUM SERPL-SCNC: 4.7 MMOL/L (ref 3.5–5.1)
RBC # BLD AUTO: 4.48 X10(6)UL (ref 4.3–5.7)
SODIUM SERPL-SCNC: 142 MMOL/L (ref 136–145)
TSI SER-ACNC: 1.34 MIU/ML (ref 0.36–3.74)
WBC # BLD AUTO: 8.1 X10(3) UL (ref 4–11)

## 2019-03-12 PROCEDURE — 85652 RBC SED RATE AUTOMATED: CPT

## 2019-03-12 PROCEDURE — 99212 OFFICE O/P EST SF 10 MIN: CPT | Performed by: NURSE PRACTITIONER

## 2019-03-12 PROCEDURE — 84443 ASSAY THYROID STIM HORMONE: CPT

## 2019-03-12 PROCEDURE — 99214 OFFICE O/P EST MOD 30 MIN: CPT | Performed by: NURSE PRACTITIONER

## 2019-03-12 PROCEDURE — 93005 ELECTROCARDIOGRAM TRACING: CPT | Performed by: NURSE PRACTITIONER

## 2019-03-12 PROCEDURE — 85027 COMPLETE CBC AUTOMATED: CPT

## 2019-03-12 PROCEDURE — 80053 COMPREHEN METABOLIC PANEL: CPT

## 2019-03-12 PROCEDURE — 93000 ELECTROCARDIOGRAM COMPLETE: CPT | Performed by: NURSE PRACTITIONER

## 2019-03-12 PROCEDURE — 36415 COLL VENOUS BLD VENIPUNCTURE: CPT

## 2019-03-12 NOTE — PROGRESS NOTES
Tessa Max is a 59year old male. Patient presents with: Follow - Up  CAD  Dizziness  Palpitations: Mild    HPI:   Patient comes in today for a checkup.   He sees Dr. Donte Mccormack he was last seen by myself in January after an episode of lightheaded dizzine mg by mouth daily.  Disp:  Rfl:       Past Medical History:   Diagnosis Date   • BPH (benign prostatic hyperplasia)    • Coronary atherosclerosis    • Elbow fracture, right 1975   • Esophageal reflux    • Fracture     scaphoid fx left wrist 1980   • Heart a from stress testing or monitor for his sinus bradycardia. We will also do basic blood work including thyroid CBC to rule out other causes. We will be in touch with patient with further recommendations.   The patient indicates understanding of these issues

## 2019-03-13 ENCOUNTER — TELEPHONE (OUTPATIENT)
Dept: CARDIOLOGY CLINIC | Facility: CLINIC | Age: 59
End: 2019-03-13

## 2019-03-13 NOTE — TELEPHONE ENCOUNTER
CARD NUC EXERCISE STRESS TEST (CPT=93017/50244) (Order #[id number]) on 3/13/19   Associated Diagnoses      I25.10 CAD, multiple vessel      R06.00 Dyspnea, unspecified type

## 2019-03-14 NOTE — TELEPHONE ENCOUNTER
RE: CPT 19437 ICD: I25.1, R06.00   S/w Lindsay  No PA required  Ref. No [id number]    Called Kim Cueto to try to get his test scheduled sooner. 3/15/19 1115 am. Spoke with Daryl Oneal and he wants to change from 3/20 to 3/15. Kim Cueto rescheduling.

## 2019-03-15 ENCOUNTER — HOSPITAL ENCOUNTER (OUTPATIENT)
Dept: NUCLEAR MEDICINE | Facility: HOSPITAL | Age: 59
Discharge: HOME | End: 2019-03-15
Attending: NURSE PRACTITIONER
Payer: COMMERCIAL

## 2019-03-15 ENCOUNTER — HOSPITAL ENCOUNTER (OUTPATIENT)
Dept: CV DIAGNOSTICS | Facility: HOSPITAL | Age: 59
Discharge: HOME | End: 2019-03-15
Attending: NURSE PRACTITIONER
Payer: COMMERCIAL

## 2019-03-15 DIAGNOSIS — R06.00 DYSPNEA, UNSPECIFIED TYPE: ICD-10-CM

## 2019-03-15 DIAGNOSIS — I25.10 CAD, MULTIPLE VESSEL: ICD-10-CM

## 2019-03-15 PROCEDURE — 93016 CV STRESS TEST SUPVJ ONLY: CPT | Performed by: INTERNAL MEDICINE

## 2019-03-15 PROCEDURE — 78452 HT MUSCLE IMAGE SPECT MULT: CPT | Performed by: INTERNAL MEDICINE

## 2019-03-15 PROCEDURE — 93018 CV STRESS TEST I&R ONLY: CPT | Performed by: INTERNAL MEDICINE

## 2019-03-15 PROCEDURE — 93017 CV STRESS TEST TRACING ONLY: CPT | Performed by: INTERNAL MEDICINE

## 2019-03-15 RX ORDER — 0.9 % SODIUM CHLORIDE 0.9 %
VIAL (ML) INJECTION
Status: COMPLETED
Start: 2019-03-15 | End: 2019-03-15

## 2019-03-15 RX ORDER — SODIUM CHLORIDE 9 MG/ML
INJECTION, SOLUTION INTRAVENOUS
Status: COMPLETED
Start: 2019-03-15 | End: 2019-03-15

## 2019-03-15 RX ADMIN — SODIUM CHLORIDE 50 ML: 9 INJECTION, SOLUTION INTRAVENOUS at 14:15:00

## 2019-03-15 RX ADMIN — 0.9 % SODIUM CHLORIDE 10 ML: 0.9 % VIAL (ML) INJECTION at 14:55:00

## 2019-03-15 NOTE — IMAGING NOTE
Patient here for nuclear stress test.Exercised for 9 minutes and 30 minutes  Unable to reach target heart rate and patient c/o feeling tired. Dr Ira Decker cardiologist go to notified. Denies any chest discomfort . Test changed to Regadenoson stress test. Expla

## 2019-03-16 ENCOUNTER — PATIENT MESSAGE (OUTPATIENT)
Dept: CARDIOLOGY CLINIC | Facility: CLINIC | Age: 59
End: 2019-03-16

## 2019-03-18 ENCOUNTER — TELEPHONE (OUTPATIENT)
Dept: CARDIOLOGY CLINIC | Facility: CLINIC | Age: 59
End: 2019-03-18

## 2019-03-18 NOTE — TELEPHONE ENCOUNTER
From: Clement Herrera  To: LINDSAY Moise  Sent: 3/16/2019 11:40 AM CDT  Subject: Test Results Question    Marlo Maciel     I hope you're doing well and I know it's Saturday.  I haven't heard anything about my nuclear stress test. Do you know anyth

## 2019-03-18 NOTE — TELEPHONE ENCOUNTER
I talked to Dipti today. He has a lot of exertional fatigue. A very mild abnormality in his stress test.  He wants to go skiing in [place] on March 31.   I have recommended a cardiac cath first.  He agrees to proceed

## 2019-03-18 NOTE — TELEPHONE ENCOUNTER
From: Bam Solorzano  To: Shannon Romero MD  Sent: 3/16/2019 11:49 AM CDT  Subject: Test Results Question    Dr Phylicia Donis    I hope you're doing well. I'm pretty sure you know that I haven't been feeling well.  Brian ordered a nuclear stress test which wa

## 2019-03-19 NOTE — TELEPHONE ENCOUNTER
Called [employer] and spoke to Jake Nunez to initiate a prior auth for left heart cath w/ possible PCI 3-21-19. Jake Nunez informed me that no auth needed.    YSR#[id number]    Spoke to patient and informed him that procedure does not need prior auth

## 2019-03-21 ENCOUNTER — HOSPITAL ENCOUNTER (OUTPATIENT)
Dept: INTERVENTIONAL RADIOLOGY/VASCULAR | Facility: HOSPITAL | Age: 59
Discharge: HOME | End: 2019-03-21
Attending: INTERNAL MEDICINE | Admitting: INTERNAL MEDICINE
Payer: COMMERCIAL

## 2019-03-21 VITALS
BODY MASS INDEX: 31 KG/M2 | WEIGHT: 180 LBS | RESPIRATION RATE: 11 BRPM | OXYGEN SATURATION: 97 % | SYSTOLIC BLOOD PRESSURE: 121 MMHG | DIASTOLIC BLOOD PRESSURE: 86 MMHG | HEART RATE: 48 BPM

## 2019-03-21 DIAGNOSIS — R53.83 FATIGUE: ICD-10-CM

## 2019-03-21 DIAGNOSIS — R94.39 ABNORMAL STRESS TEST: ICD-10-CM

## 2019-03-21 DIAGNOSIS — R42 LIGHT HEADED: ICD-10-CM

## 2019-03-21 PROCEDURE — B2181ZZ FLUOROSCOPY OF LEFT INTERNAL MAMMARY BYPASS GRAFT USING LOW OSMOLAR CONTRAST: ICD-10-PCS | Performed by: INTERNAL MEDICINE

## 2019-03-21 PROCEDURE — 99152 MOD SED SAME PHYS/QHP 5/>YRS: CPT

## 2019-03-21 PROCEDURE — 93459 L HRT ART/GRFT ANGIO: CPT

## 2019-03-21 PROCEDURE — B2131ZZ FLUOROSCOPY OF MULTIPLE CORONARY ARTERY BYPASS GRAFTS USING LOW OSMOLAR CONTRAST: ICD-10-PCS | Performed by: INTERNAL MEDICINE

## 2019-03-21 PROCEDURE — B2111ZZ FLUOROSCOPY OF MULTIPLE CORONARY ARTERIES USING LOW OSMOLAR CONTRAST: ICD-10-PCS | Performed by: INTERNAL MEDICINE

## 2019-03-21 PROCEDURE — 99153 MOD SED SAME PHYS/QHP EA: CPT

## 2019-03-21 PROCEDURE — 4A023N7 MEASUREMENT OF CARDIAC SAMPLING AND PRESSURE, LEFT HEART, PERCUTANEOUS APPROACH: ICD-10-PCS | Performed by: INTERNAL MEDICINE

## 2019-03-21 RX ORDER — LIDOCAINE HYDROCHLORIDE 20 MG/ML
INJECTION, SOLUTION EPIDURAL; INFILTRATION; INTRACAUDAL; PERINEURAL
Status: COMPLETED
Start: 2019-03-21 | End: 2019-03-21

## 2019-03-21 RX ORDER — MIDAZOLAM HYDROCHLORIDE 1 MG/ML
INJECTION INTRAMUSCULAR; INTRAVENOUS
Status: COMPLETED
Start: 2019-03-21 | End: 2019-03-21

## 2019-03-21 RX ORDER — CHLORHEXIDINE GLUCONATE 4 G/100ML
30 SOLUTION TOPICAL
Status: DISCONTINUED | OUTPATIENT
Start: 2019-03-21 | End: 2019-03-21

## 2019-03-21 RX ORDER — SODIUM CHLORIDE 9 MG/ML
INJECTION, SOLUTION INTRAVENOUS
Status: COMPLETED
Start: 2019-03-21 | End: 2019-03-21

## 2019-03-21 RX ORDER — SODIUM CHLORIDE 9 MG/ML
INJECTION, SOLUTION INTRAVENOUS CONTINUOUS
Status: DISCONTINUED | OUTPATIENT
Start: 2019-03-21 | End: 2019-03-21

## 2019-03-21 RX ORDER — SODIUM CHLORIDE 9 MG/ML
INJECTION, SOLUTION INTRAVENOUS CONTINUOUS
Status: ACTIVE | OUTPATIENT
Start: 2019-03-21 | End: 2019-03-21

## 2019-03-21 RX ADMIN — SODIUM CHLORIDE: 9 INJECTION, SOLUTION INTRAVENOUS at 09:15:00

## 2019-03-21 NOTE — PROCEDURES
[facility]    PATIENT'S NAME: Gabriela Wattshenrietta   ATTENDING PHYSICIAN: Lana Franklin. Gamaliel Rosa MD   OPERATING PHYSICIAN: Lana Franklin.  Gamaliel Rosa MD   PATIENT ACCOUNT#:   [id number]    LOCATION:  [id number] [address]  MEDICAL RECORD #:   [id number] retrograde flow into the posterolateral branch and into the mid right coronary artery.     The left internal mammary graft is anastomosed into the second diagonal.  The graft and second diagonal are widely patent with retrograde flow into the small above-me

## 2019-03-21 NOTE — INTERVAL H&P NOTE
Pre-op Diagnosis: * No pre-op diagnosis entered *    The above referenced H&P was reviewed by Hipolito Stacy MD on 3/21/2019, the patient was examined and no significant changes have occurred in the patient's condition since the H&P was performed.   I discu

## 2019-03-21 NOTE — PROGRESS NOTES
Tevin Dates  [id number]  3/21/2019    Pt is able to sit up and ambulate without difficulty. Pt voided and tolerated fluids/food. Pt's vital signs are stable. Procedural site remains dry and intact with good circulation, motion and sensation.  No sign

## 2019-03-21 NOTE — PROGRESS NOTES
[facility]    Brief Cardiac Cath Note  Polo Bahena Patient Status:  Outpatient in a Bed    10/9/1960 MRN [id number]   Location [facility] Attending Noreen Valero MD   Hosp Day # 0 PCP Willie Mariscal

## 2019-03-22 ENCOUNTER — TELEPHONE (OUTPATIENT)
Dept: CARDIOLOGY CLINIC | Facility: CLINIC | Age: 59
End: 2019-03-22

## 2019-03-22 RX ORDER — AMLODIPINE BESYLATE 5 MG/1
5 TABLET ORAL DAILY
Qty: 30 TABLET | Refills: 2 | Status: SHIPPED | OUTPATIENT
Start: 2019-03-22 | End: 2019-06-13

## 2019-03-22 NOTE — TELEPHONE ENCOUNTER
Pt had angiogram yesterday and per Dr. Peterson Page wants pt to start amlodipine 5mg, Rx sent to pharm, pt informed.

## 2019-03-29 ENCOUNTER — OFFICE VISIT (OUTPATIENT)
Dept: CARDIOLOGY CLINIC | Facility: CLINIC | Age: 59
End: 2019-03-29
Payer: COMMERCIAL

## 2019-03-29 VITALS
RESPIRATION RATE: 16 BRPM | WEIGHT: 181 LBS | HEART RATE: 60 BPM | SYSTOLIC BLOOD PRESSURE: 134 MMHG | BODY MASS INDEX: 30.52 KG/M2 | DIASTOLIC BLOOD PRESSURE: 90 MMHG | HEIGHT: 64.5 IN

## 2019-03-29 DIAGNOSIS — R00.1 BRADYCARDIA: Primary | ICD-10-CM

## 2019-03-29 PROCEDURE — 99213 OFFICE O/P EST LOW 20 MIN: CPT | Performed by: NURSE PRACTITIONER

## 2019-03-29 PROCEDURE — 99212 OFFICE O/P EST SF 10 MIN: CPT | Performed by: NURSE PRACTITIONER

## 2019-03-29 NOTE — PROGRESS NOTES
Babar Patrick is a 59year old male. Patient presents with:   Follow - Up: cath f/u , groin check  CAD: CABG  Dizziness    HPI:   Patient comes in today for a checkup after an angiogram.  He sees Dr. Jennifer Lynne I actually saw him a few weeks ago he was experi Disp:  Rfl:       Past Medical History:   Diagnosis Date   • BPH (benign prostatic hyperplasia)    • Coronary atherosclerosis    • Elbow fracture, right 1975   • Esophageal reflux    • Fracture     scaphoid fx left wrist 1980   • [facility] 02/13/2 with Dr. Todd Chambers about a month after this.   He will also continue to check his blood pressures with the addition of amlodipine and call if his pressures are either too high or too low  The patient indicates understanding of these issues and agrees to the plan

## 2019-04-30 ENCOUNTER — HOSPITAL ENCOUNTER (OUTPATIENT)
Dept: CV DIAGNOSTICS | Facility: HOSPITAL | Age: 59
Discharge: HOME | End: 2019-04-30
Attending: NURSE PRACTITIONER
Payer: COMMERCIAL

## 2019-04-30 DIAGNOSIS — R00.1 BRADYCARDIA: ICD-10-CM

## 2019-04-30 PROCEDURE — 93227 XTRNL ECG REC<48 HR R&I: CPT | Performed by: NURSE PRACTITIONER

## 2019-04-30 PROCEDURE — 93225 XTRNL ECG REC<48 HRS REC: CPT | Performed by: NURSE PRACTITIONER

## 2019-05-09 ENCOUNTER — TELEPHONE (OUTPATIENT)
Dept: CARDIOLOGY CLINIC | Facility: CLINIC | Age: 59
End: 2019-05-09

## 2019-05-09 NOTE — TELEPHONE ENCOUNTER
Notes recorded by LINDSAY Ruiz on 5/6/2019 at 2:28 PM CDT  holter monitor shows no significant arrhythmias and no pauses, CPM     Detailed message left (HIPAA verified) of results of holter monitor not showing any significant arrhythmias or

## 2019-05-13 ENCOUNTER — OFFICE VISIT (OUTPATIENT)
Dept: CARDIOLOGY CLINIC | Facility: CLINIC | Age: 59
End: 2019-05-13
Payer: COMMERCIAL

## 2019-05-13 VITALS
WEIGHT: 181 LBS | RESPIRATION RATE: 20 BRPM | OXYGEN SATURATION: 97 % | BODY MASS INDEX: 31 KG/M2 | DIASTOLIC BLOOD PRESSURE: 89 MMHG | HEART RATE: 56 BPM | SYSTOLIC BLOOD PRESSURE: 130 MMHG

## 2019-05-13 DIAGNOSIS — E78.00 PURE HYPERCHOLESTEROLEMIA: ICD-10-CM

## 2019-05-13 DIAGNOSIS — I25.10 CAD, MULTIPLE VESSEL: Primary | ICD-10-CM

## 2019-05-13 PROCEDURE — 99212 OFFICE O/P EST SF 10 MIN: CPT | Performed by: INTERNAL MEDICINE

## 2019-05-13 PROCEDURE — 99214 OFFICE O/P EST MOD 30 MIN: CPT | Performed by: INTERNAL MEDICINE

## 2019-05-13 NOTE — PROGRESS NOTES
Name:  Judy Perdomo  : 10/9/1960    Date of consultation:   2019    Referring physician: Stan Ramirez MD    Reason for Visit:  Patient presents with:   Follow - Up  Dizziness: Mild       HPI:   Dipti had a cardiac catheterization last month th status: Never Smoker      Smokeless tobacco: Never Used    Alcohol use:  Yes      Alcohol/week: 6.0 oz      Types: 5 Cans of beer, 5 Shots of liquor per week    Drug use: No       ROS:   GENERAL HEALTH: generally feels well, tolerating meds well  RESPIRATOR hypercholesterolemia  Lipids are excellent. We will continue as is.          Last Menon MD

## 2019-06-17 RX ORDER — AMLODIPINE BESYLATE 5 MG/1
TABLET ORAL
Qty: 90 TABLET | Refills: 1 | Status: SHIPPED | OUTPATIENT
Start: 2019-06-17 | End: 2021-12-13

## 2019-06-17 NOTE — TELEPHONE ENCOUNTER
Amlodipine 5 mg daily  LOV reviewed. No change in this medication. Meets refill protocol criteria. Refill sent.     Hypertensive Medications  Protocol Criteria:  · Appointment scheduled with Cardiology in the past 12 months or in the next 3 months  · BMP o

## 2019-10-17 ENCOUNTER — TELEPHONE (OUTPATIENT)
Dept: CARDIOLOGY CLINIC | Facility: CLINIC | Age: 59
End: 2019-10-17

## 2019-10-17 NOTE — TELEPHONE ENCOUNTER
Patient requesting to speak with clinical staff - he has been transferred all over the place and is frustruated. States he had angiogram in Feb 2019 and cannot understand why he is being billed through [facility] as he is not an [facility] patient. Patient states insurance declined paying procedure. Patient is upset that he has not gotten the assistance he needs. Please call. Thank you.

## 2019-10-18 NOTE — TELEPHONE ENCOUNTER
Spoke with patient who was pleasant with reporting his billing frustrations. Patient reports he is a patient of Dr. Christiano Strange and see's him at the [facility]. Patient reports his insurance does not cover [facility] and that is why he see's Dr. Zen Gray at [facility]. Patient had an angiogram at [facility] on 3/21/19. Patient reports ongoing billing issues with no resolution, pt complains of many hours on the phone with insurance and billing in recent months. Pt reports he spoke with a representative named Flip Crabtree most recently (approximately a week ago) in [facility] Billing who informed patient that he will \"talk to [facility] to get the bill re-submitted through [address] this could take a week or so\" pt reports a week has gone by and no follow up and no one knows of that call. Patient informed the clinic does not directly handle billing but I will investigate this and find the right person to report the issue to and get a status update. Patient informed to expect a call back Monday 10/21 with a status update. Patient voiced understanding and appreciation. Ector Mccormack was contacted to inquire who can help this patient. Awaiting update.

## 2019-10-21 NOTE — TELEPHONE ENCOUNTER
Spoke with patient informed there are people looking into the issue, no resolution update yet but will follow up again on Wednesday 10/23. Patient was very appreciative. Spoke w/ Joel Isaacs she is having Yodit Glass look over billing, she will make sure to have someone reach out to patient to review billing.

## 2019-12-09 ENCOUNTER — OFFICE VISIT (OUTPATIENT)
Dept: CARDIOLOGY CLINIC | Facility: CLINIC | Age: 59
End: 2019-12-09
Payer: COMMERCIAL

## 2019-12-09 VITALS
HEART RATE: 61 BPM | HEIGHT: 64.5 IN | BODY MASS INDEX: 30.52 KG/M2 | DIASTOLIC BLOOD PRESSURE: 84 MMHG | WEIGHT: 181 LBS | SYSTOLIC BLOOD PRESSURE: 129 MMHG

## 2019-12-09 DIAGNOSIS — E78.00 PURE HYPERCHOLESTEROLEMIA: ICD-10-CM

## 2019-12-09 DIAGNOSIS — I25.10 CORONARY ARTERY DISEASE INVOLVING NATIVE CORONARY ARTERY OF NATIVE HEART WITHOUT ANGINA PECTORIS: Primary | ICD-10-CM

## 2019-12-09 PROCEDURE — 99214 OFFICE O/P EST MOD 30 MIN: CPT | Performed by: INTERNAL MEDICINE

## 2019-12-09 NOTE — PROGRESS NOTES
Name:  Maria De Jesus Raines  : 10/9/1960    Date of consultation:   2019    Referring physician: Justina Mora MD    Reason for Visit:  Patient presents with: Follow - Up: CAD ,,occ chest pain       HPI:   Roma Last is generally doing well.   He had a cat per week    Drug use: No       ROS:   GENERAL HEALTH: generally feels well, tolerating meds well  RESPIRATORY: no cough or shortness of breath  CARDIOVASCULAR: no chest pain, palpitations  GI: no abdominal pain   NEURO: no syncope or near syncope    All ot

## 2019-12-13 ENCOUNTER — TELEPHONE (OUTPATIENT)
Dept: CARDIOLOGY CLINIC | Facility: CLINIC | Age: 59
End: 2019-12-13

## 2019-12-13 DIAGNOSIS — I25.10 CAD, MULTIPLE VESSEL: Primary | ICD-10-CM

## 2020-01-08 NOTE — TELEPHONE ENCOUNTER
His LDL has gone up a bit, make sure he is taking his atorvastatin everyday and watch diet.  Recheck in 6 months if LDL is still above 70 then will increase lipitor

## 2020-01-14 NOTE — TELEPHONE ENCOUNTER
S/w  Shelley Mcneal advised of RADHA 's recommendations regarding watching diet and rechecking cholesterol levels in 6 months. Admits to eating icecream in the evening before bed.  Recommended replacing that snack for a healthier stack like a handful of pist

## 2020-12-07 ENCOUNTER — OFFICE VISIT (OUTPATIENT)
Dept: CARDIOLOGY CLINIC | Facility: CLINIC | Age: 60
End: 2020-12-07
Payer: COMMERCIAL

## 2020-12-07 VITALS
DIASTOLIC BLOOD PRESSURE: 77 MMHG | HEART RATE: 51 BPM | WEIGHT: 186 LBS | BODY MASS INDEX: 31 KG/M2 | SYSTOLIC BLOOD PRESSURE: 114 MMHG

## 2020-12-07 DIAGNOSIS — I25.10 CORONARY ARTERY DISEASE INVOLVING NATIVE CORONARY ARTERY OF NATIVE HEART WITHOUT ANGINA PECTORIS: Primary | ICD-10-CM

## 2020-12-07 DIAGNOSIS — E78.00 PURE HYPERCHOLESTEROLEMIA: ICD-10-CM

## 2020-12-07 PROCEDURE — 3074F SYST BP LT 130 MM HG: CPT | Performed by: INTERNAL MEDICINE

## 2020-12-07 PROCEDURE — 3078F DIAST BP <80 MM HG: CPT | Performed by: INTERNAL MEDICINE

## 2020-12-07 PROCEDURE — 99214 OFFICE O/P EST MOD 30 MIN: CPT | Performed by: INTERNAL MEDICINE

## 2020-12-07 RX ORDER — ESCITALOPRAM OXALATE 10 MG/1
10 TABLET ORAL DAILY
COMMUNITY
Start: 2020-09-30

## 2020-12-07 NOTE — PROGRESS NOTES
Name:  Clement Herrera  : 10/9/1960    Date of consultation:   2020    Referring physician: Shantal Manzanares MD    Reason for Visit:  Patient presents with:   Follow - Up: no symptoms  CAD  Hyperlipidemia      HPI:   Patient is status post inferior w ROS:   GENERAL HEALTH: generally feels well, tolerating meds well  RESPIRATORY: no cough or shortness of breath  CARDIOVASCULAR: no chest pain, palpitations  GI: no abdominal pain   NEURO: no syncope or near syncope    All other pertinent systems revie

## 2021-07-24 ENCOUNTER — HOSPITAL ENCOUNTER (EMERGENCY)
Facility: HOSPITAL | Age: 61
Discharge: HOME | End: 2021-07-25
Attending: EMERGENCY MEDICINE
Payer: COMMERCIAL

## 2021-07-24 DIAGNOSIS — R10.13 EPIGASTRIC PAIN: Primary | ICD-10-CM

## 2021-07-24 LAB
ANION GAP SERPL CALC-SCNC: 6 MMOL/L (ref 0–18)
BASOPHILS # BLD AUTO: 0.03 X10(3) UL (ref 0–0.2)
BASOPHILS NFR BLD AUTO: 0.4 %
BUN BLD-MCNC: 15 MG/DL (ref 7–18)
BUN/CREAT SERPL: 17.4 (ref 10–20)
CALCIUM BLD-MCNC: 8.7 MG/DL (ref 8.5–10.1)
CHLORIDE SERPL-SCNC: 110 MMOL/L (ref 98–112)
CO2 SERPL-SCNC: 25 MMOL/L (ref 21–32)
CREAT BLD-MCNC: 0.86 MG/DL
DEPRECATED RDW RBC AUTO: 42.7 FL (ref 35.1–46.3)
EOSINOPHIL # BLD AUTO: 0.15 X10(3) UL (ref 0–0.7)
EOSINOPHIL NFR BLD AUTO: 2 %
ERYTHROCYTE [DISTWIDTH] IN BLOOD BY AUTOMATED COUNT: 12.9 % (ref 11–15)
GLUCOSE BLD-MCNC: 120 MG/DL (ref 70–99)
HCT VFR BLD AUTO: 35.4 %
HGB BLD-MCNC: 12 G/DL
IMM GRANULOCYTES # BLD AUTO: 0.03 X10(3) UL (ref 0–1)
IMM GRANULOCYTES NFR BLD: 0.4 %
LYMPHOCYTES # BLD AUTO: 2 X10(3) UL (ref 1–4)
LYMPHOCYTES NFR BLD AUTO: 26.9 %
MCH RBC QN AUTO: 30.5 PG (ref 26–34)
MCHC RBC AUTO-ENTMCNC: 33.9 G/DL (ref 31–37)
MCV RBC AUTO: 90.1 FL
MONOCYTES # BLD AUTO: 1.02 X10(3) UL (ref 0.1–1)
MONOCYTES NFR BLD AUTO: 13.7 %
NEUTROPHILS # BLD AUTO: 4.21 X10 (3) UL (ref 1.5–7.7)
NEUTROPHILS # BLD AUTO: 4.21 X10(3) UL (ref 1.5–7.7)
NEUTROPHILS NFR BLD AUTO: 56.6 %
OSMOLALITY SERPL CALC.SUM OF ELEC: 294 MOSM/KG (ref 275–295)
PLATELET # BLD AUTO: 225 10(3)UL (ref 150–450)
POTASSIUM SERPL-SCNC: 4.8 MMOL/L (ref 3.5–5.1)
RBC # BLD AUTO: 3.93 X10(6)UL
SODIUM SERPL-SCNC: 141 MMOL/L (ref 136–145)
TROPONIN I SERPL-MCNC: <0.045 NG/ML (ref ?–0.04)
WBC # BLD AUTO: 7.4 X10(3) UL (ref 4–11)

## 2021-07-24 PROCEDURE — 85025 COMPLETE CBC W/AUTO DIFF WBC: CPT | Performed by: EMERGENCY MEDICINE

## 2021-07-24 PROCEDURE — 80048 BASIC METABOLIC PNL TOTAL CA: CPT | Performed by: EMERGENCY MEDICINE

## 2021-07-24 PROCEDURE — 96374 THER/PROPH/DIAG INJ IV PUSH: CPT

## 2021-07-24 PROCEDURE — 93005 ELECTROCARDIOGRAM TRACING: CPT

## 2021-07-24 PROCEDURE — 96361 HYDRATE IV INFUSION ADD-ON: CPT

## 2021-07-24 PROCEDURE — 84484 ASSAY OF TROPONIN QUANT: CPT | Performed by: EMERGENCY MEDICINE

## 2021-07-24 PROCEDURE — S0028 INJECTION, FAMOTIDINE, 20 MG: HCPCS | Performed by: EMERGENCY MEDICINE

## 2021-07-24 PROCEDURE — 99284 EMERGENCY DEPT VISIT MOD MDM: CPT

## 2021-07-24 PROCEDURE — 93010 ELECTROCARDIOGRAM REPORT: CPT | Performed by: EMERGENCY MEDICINE

## 2021-07-24 RX ORDER — FAMOTIDINE 10 MG/ML
20 INJECTION, SOLUTION INTRAVENOUS ONCE
Status: COMPLETED | OUTPATIENT
Start: 2021-07-24 | End: 2021-07-24

## 2021-07-25 VITALS
BODY MASS INDEX: 31.76 KG/M2 | HEART RATE: 53 BPM | RESPIRATION RATE: 19 BRPM | OXYGEN SATURATION: 97 % | WEIGHT: 186 LBS | TEMPERATURE: 98 F | HEIGHT: 64 IN | DIASTOLIC BLOOD PRESSURE: 82 MMHG | SYSTOLIC BLOOD PRESSURE: 126 MMHG

## 2021-07-25 LAB — TROPONIN I SERPL-MCNC: <0.045 NG/ML (ref ?–0.04)

## 2021-07-25 PROCEDURE — 84484 ASSAY OF TROPONIN QUANT: CPT | Performed by: EMERGENCY MEDICINE

## 2021-07-25 PROCEDURE — 93010 ELECTROCARDIOGRAM REPORT: CPT | Performed by: EMERGENCY MEDICINE

## 2021-07-25 PROCEDURE — 93005 ELECTROCARDIOGRAM TRACING: CPT

## 2021-07-25 NOTE — ED PROVIDER NOTES
Patient Seen in: [facility] AND [facility] Emergency Department      History   Patient presents with:  Chest Pain Angina    Stated Complaint: Chest Pain    HPI/Subjective:   HPI    69-year-old male with history of CAD status post triple bypass, hypertension, hyp Negative for back pain. Skin: Negative for rash. Neurological: Negative for dizziness. Psychiatric/Behavioral: Negative for suicidal ideas. All other systems reviewed and are negative.       Positive for stated complaint: Chest Pain  Other systems a hour(s))   RAINBOW DRAW LAVENDER    Collection Time: 07/24/21 10:19 PM   Result Value Ref Range    Hold Lavender Auto Resulted    RAINBOW DRAW LIGHT GREEN    Collection Time: 07/24/21 10:19 PM   Result Value Ref Range    Hold Lt Green Auto Resulted    RAIN 12:20 AM   Result Value Ref Range    Troponin <0.045 <0.045 ng/mL       Imaging Results Available and Reviewed by me while in ED:  No results found.       EMERGENCY DEPARTMENT COURSE AND TREATMENT:  Patient's condition was stable during Emergency Department chills, vomiting, pt expresses understanding and agrees to d/c instructions    EMERGENCY DEPARTMENT MEDICAL DECISION MAKING:  After obtaining the patient's history, performing the physical exam and reviewing the diagnostics, multiple initial diagnoses were

## 2021-07-25 NOTE — ED INITIAL ASSESSMENT (HPI)
Pt states he had sudden onset of chest pain that started at 2100. Pt states he took 4 baby aspirins at home. He states he had a heart attack 4 years ago.

## 2021-12-13 ENCOUNTER — OFFICE VISIT (OUTPATIENT)
Dept: CARDIOLOGY CLINIC | Facility: CLINIC | Age: 61
End: 2021-12-13
Payer: COMMERCIAL

## 2021-12-13 VITALS
BODY MASS INDEX: 32.1 KG/M2 | DIASTOLIC BLOOD PRESSURE: 83 MMHG | WEIGHT: 188 LBS | HEART RATE: 55 BPM | SYSTOLIC BLOOD PRESSURE: 124 MMHG | HEIGHT: 64 IN

## 2021-12-13 DIAGNOSIS — I25.10 CORONARY ARTERY DISEASE INVOLVING NATIVE CORONARY ARTERY OF NATIVE HEART WITHOUT ANGINA PECTORIS: Primary | ICD-10-CM

## 2021-12-13 DIAGNOSIS — E78.00 PURE HYPERCHOLESTEROLEMIA: ICD-10-CM

## 2021-12-13 PROCEDURE — 3008F BODY MASS INDEX DOCD: CPT | Performed by: INTERNAL MEDICINE

## 2021-12-13 PROCEDURE — 3079F DIAST BP 80-89 MM HG: CPT | Performed by: INTERNAL MEDICINE

## 2021-12-13 PROCEDURE — 99214 OFFICE O/P EST MOD 30 MIN: CPT | Performed by: INTERNAL MEDICINE

## 2021-12-13 PROCEDURE — 3074F SYST BP LT 130 MM HG: CPT | Performed by: INTERNAL MEDICINE

## 2021-12-13 RX ORDER — AMLODIPINE BESYLATE 5 MG/1
5 TABLET ORAL DAILY
Qty: 90 TABLET | Refills: 3 | Status: CANCELLED | OUTPATIENT
Start: 2021-12-13

## 2021-12-13 RX ORDER — AMLODIPINE BESYLATE 5 MG/1
5 TABLET ORAL DAILY
Qty: 90 TABLET | Refills: 3 | Status: SHIPPED | OUTPATIENT
Start: 2021-12-13

## 2021-12-13 NOTE — PROGRESS NOTES
Name:  Lee Shrestha  : 10/9/1960    Date of consultation:   2021    Referring physician: Blanca Aleman MD    Reason for Visit:  Patient presents with: Follow - Up:  Annual  CAD  Chest Pressure: at times      HPI:   Patient with remote myocard Smoking status: Never Smoker      Smokeless tobacco: Never Used    Alcohol use:  Yes      Alcohol/week: 10.0 standard drinks      Types: 5 Cans of beer, 5 Shots of liquor per week    Drug use: No       ROS:   GENERAL HEALTH: generally feels well, tolerating doing well. Continue as is. Last stress test was 2 and half years ago. Will probably do one next year.     2. Pure hypercholesterolemia  Lipids doing well as noted above    See me in 1 year or as needed         Gely Mccoy MD

## 2023-02-15 ENCOUNTER — APPOINTMENT (OUTPATIENT)
Dept: URBAN - METROPOLITAN AREA CLINIC 244 | Age: 63
Setting detail: DERMATOLOGY
End: 2023-02-16

## 2023-02-15 DIAGNOSIS — B07.8 OTHER VIRAL WARTS: ICD-10-CM

## 2023-02-15 PROCEDURE — 99204 OFFICE O/P NEW MOD 45 MIN: CPT | Mod: 25

## 2023-02-15 PROCEDURE — OTHER SEPARATE AND IDENTIFIABLE DOCUMENTATION: OTHER

## 2023-02-15 PROCEDURE — OTHER COUNSELING: OTHER

## 2023-02-15 PROCEDURE — 17110 DESTRUCT B9 LESION 1-14: CPT

## 2023-02-15 PROCEDURE — OTHER LIQUID NITROGEN: OTHER

## 2023-02-15 PROCEDURE — OTHER PRESCRIPTION: OTHER

## 2023-02-15 ASSESSMENT — LOCATION ZONE DERM
LOCATION ZONE: LEG
LOCATION ZONE: FINGER
LOCATION ZONE: FINGERNAIL

## 2023-02-15 ASSESSMENT — LOCATION DETAILED DESCRIPTION DERM
LOCATION DETAILED: RIGHT ACHILLES SKIN
LOCATION DETAILED: LEFT DISTAL DORSAL INDEX FINGER
LOCATION DETAILED: LEFT DISTAL RADIAL THUMB
LOCATION DETAILED: LEFT THUMBNAIL
LOCATION DETAILED: RIGHT PROXIMAL DORSAL MIDDLE FINGER
LOCATION DETAILED: LEFT PROXIMAL DORSAL INDEX FINGER

## 2023-02-15 ASSESSMENT — LOCATION SIMPLE DESCRIPTION DERM
LOCATION SIMPLE: LEFT INDEX FINGER
LOCATION SIMPLE: RIGHT MIDDLE FINGER
LOCATION SIMPLE: RIGHT ACHILLES SKIN
LOCATION SIMPLE: LEFT THUMB

## 2023-02-15 NOTE — PROCEDURE: COUNSELING
Topical Salicylic Acid Recommendations: Always wait 5-7 days after cryotherapy/cryodestruction before using.
Detail Level: Zone
Cryotherapy Recommendations: Recommend cryodestruction/cryotherapy to warts given their viral nature and propensity to grow or spread in some individuals, but I discussed the risk of blistering, pain, dyschromia, need for repeat treatments, rare risk of scarring with treatment, among other risks associated with treatment. Advised pt that treatment on feet/hands may limit daily activities if pain is significant after treatment. Risks, benefits, and alternatives of cryotherapy discussed.

## 2023-02-15 NOTE — PROCEDURE: LIQUID NITROGEN
Medical Necessity Information: It is in your best interest to select a reason for this procedure from the list below. All of these items fulfill various CMS LCD requirements except the new and changing color options.
Render Note In Bullet Format When Appropriate: Yes
Number Of Freeze-Thaw Cycles: 2 freeze-thaw cycles
Consent: The patient's consent was obtained including but not limited to risks of crusting, scabbing, blistering, scarring, darker or lighter pigmentary change, recurrence, incomplete removal/need for repeat treatments, and infection. Patient understands that treatment on feet/hands may limit daily activities if pain is significant after treatment and/or if blistering occurs.
Add 52 Modifier (Optional): no
Medical Necessity Clause: This procedure was medically necessary because the lesions that were treated were:
Detail Level: Detailed
Duration Of Freeze Thaw-Cycle (Seconds): 5-10
Post-Care Instructions: I reviewed with the patient in detail post-care instructions. Patient is to wear sunprotection, and avoid picking at any of the treated lesions. Pt may apply Vaseline to crusted or scabbing areas. If blistering occurs, pt understands to not pop blister and can cover with Vaseline + Band-Aid. If any topicals are prescribed (i.e wart peel or topical S.A), pt is to wait 5-7 days before applying to treated areas.
Application Tool (Optional): Liquid Nitrogen Sprayer
Spray Paint Text: The liquid nitrogen was applied to the skin utilizing a spray paint frosting technique.

## 2023-03-08 ENCOUNTER — APPOINTMENT (OUTPATIENT)
Dept: URBAN - METROPOLITAN AREA CLINIC 244 | Age: 63
Setting detail: DERMATOLOGY
End: 2023-03-09

## 2023-03-08 DIAGNOSIS — B07.8 OTHER VIRAL WARTS: ICD-10-CM

## 2023-03-08 DIAGNOSIS — L24 IRRITANT CONTACT DERMATITIS: ICD-10-CM

## 2023-03-08 PROBLEM — L24.9 IRRITANT CONTACT DERMATITIS, UNSPECIFIED CAUSE: Status: ACTIVE | Noted: 2023-03-08

## 2023-03-08 PROCEDURE — OTHER LIQUID NITROGEN: OTHER

## 2023-03-08 PROCEDURE — OTHER PRESCRIPTION: OTHER

## 2023-03-08 PROCEDURE — OTHER PRESCRIPTION MEDICATION MANAGEMENT: OTHER

## 2023-03-08 PROCEDURE — 17110 DESTRUCT B9 LESION 1-14: CPT

## 2023-03-08 PROCEDURE — 99213 OFFICE O/P EST LOW 20 MIN: CPT | Mod: 25

## 2023-03-08 PROCEDURE — OTHER COUNSELING: OTHER

## 2023-03-08 RX ORDER — CLOBETASOL PROPIONATE 0.5 MG/G
CREAM TOPICAL
Qty: 15 | Refills: 0 | Status: ERX | COMMUNITY
Start: 2023-03-08

## 2023-03-08 ASSESSMENT — LOCATION DETAILED DESCRIPTION DERM
LOCATION DETAILED: RIGHT PROXIMAL DORSAL MIDDLE FINGER
LOCATION DETAILED: RIGHT HEEL
LOCATION DETAILED: RIGHT PROXIMAL DORSAL INDEX FINGER
LOCATION DETAILED: LEFT DISTAL RADIAL DORSAL SMALL FINGER

## 2023-03-08 ASSESSMENT — LOCATION ZONE DERM
LOCATION ZONE: FEET
LOCATION ZONE: FINGER

## 2023-03-08 ASSESSMENT — LOCATION SIMPLE DESCRIPTION DERM
LOCATION SIMPLE: LEFT SMALL FINGER
LOCATION SIMPLE: RIGHT MIDDLE FINGER
LOCATION SIMPLE: RIGHT INDEX FINGER
LOCATION SIMPLE: RIGHT HEEL

## 2023-03-08 NOTE — PROCEDURE: LIQUID NITROGEN
Show Aperture Variable?: Yes
Number Of Freeze-Thaw Cycles: 2 freeze-thaw cycles
Spray Paint Text: The liquid nitrogen was applied to the skin utilizing a spray paint frosting technique.
Medical Necessity Clause: This procedure was medically necessary because the lesions that were treated were:
Post-Care Instructions: I reviewed with the patient in detail post-care instructions. Patient is to wear sunprotection, and avoid picking at any of the treated lesions. Pt may apply Vaseline to crusted or scabbing areas. If blistering occurs, pt understands to not pop blister and can cover with Vaseline + Band-Aid. If any topicals are prescribed (i.e wart peel or topical S.A), pt is to wait 5-7 days before applying to treated areas.
Detail Level: Detailed
Application Tool (Optional): Liquid Nitrogen Sprayer
Duration Of Freeze Thaw-Cycle (Seconds): 5-10
Consent: The patient's consent was obtained including but not limited to risks of crusting, scabbing, blistering, scarring, darker or lighter pigmentary change, recurrence, incomplete removal/need for repeat treatments, and infection. Patient understands that treatment on feet/hands may limit daily activities if pain is significant after treatment and/or if blistering occurs.
Add 52 Modifier (Optional): no
Medical Necessity Information: It is in your best interest to select a reason for this procedure from the list below. All of these items fulfill various CMS LCD requirements except the new and changing color options.

## 2023-03-08 NOTE — PROCEDURE: PRESCRIPTION MEDICATION MANAGEMENT
Render In Strict Bullet Format?: No
Plan: hold wart peel. trial topical steroid to erythematous areas
Detail Level: Zone
Plan: topical steroid as noted in the note
